# Patient Record
Sex: FEMALE | Race: WHITE | Employment: OTHER | URBAN - METROPOLITAN AREA
[De-identification: names, ages, dates, MRNs, and addresses within clinical notes are randomized per-mention and may not be internally consistent; named-entity substitution may affect disease eponyms.]

---

## 2021-02-01 LAB — AMB EXT COVID-19 RESULT: DETECTED

## 2021-02-25 ENCOUNTER — HOSPITAL ENCOUNTER (OUTPATIENT)
Facility: HOSPITAL | Age: 76
Setting detail: OBSERVATION
LOS: 1 days | Discharge: HOME | End: 2021-02-26
Attending: STUDENT IN AN ORGANIZED HEALTH CARE EDUCATION/TRAINING PROGRAM | Admitting: HOSPITALIST
Payer: MEDICARE

## 2021-02-25 ENCOUNTER — APPOINTMENT (OUTPATIENT)
Dept: GENERAL RADIOLOGY | Facility: HOSPITAL | Age: 76
End: 2021-02-25
Attending: STUDENT IN AN ORGANIZED HEALTH CARE EDUCATION/TRAINING PROGRAM
Payer: MEDICARE

## 2021-02-25 ENCOUNTER — WALK IN (OUTPATIENT)
Dept: URGENT CARE | Age: 76
End: 2021-02-25
Attending: EMERGENCY MEDICINE

## 2021-02-25 VITALS
DIASTOLIC BLOOD PRESSURE: 84 MMHG | TEMPERATURE: 98 F | WEIGHT: 170 LBS | HEART RATE: 81 BPM | RESPIRATION RATE: 16 BRPM | SYSTOLIC BLOOD PRESSURE: 151 MMHG | OXYGEN SATURATION: 98 %

## 2021-02-25 DIAGNOSIS — Z86.16 HISTORY OF COVID-19: ICD-10-CM

## 2021-02-25 DIAGNOSIS — R55 SYNCOPE, NEAR: ICD-10-CM

## 2021-02-25 DIAGNOSIS — R06.00 DYSPNEA ON EXERTION: ICD-10-CM

## 2021-02-25 DIAGNOSIS — R53.1 WEAKNESS GENERALIZED: Primary | ICD-10-CM

## 2021-02-25 DIAGNOSIS — E87.6 HYPOKALEMIA: ICD-10-CM

## 2021-02-25 DIAGNOSIS — R00.2 PALPITATIONS: ICD-10-CM

## 2021-02-25 DIAGNOSIS — E87.1 HYPONATREMIA: ICD-10-CM

## 2021-02-25 DIAGNOSIS — R00.2 PALPITATIONS: Primary | ICD-10-CM

## 2021-02-25 DIAGNOSIS — R51.9 NONINTRACTABLE EPISODIC HEADACHE, UNSPECIFIED HEADACHE TYPE: ICD-10-CM

## 2021-02-25 DIAGNOSIS — R53.1 WEAKNESS GENERALIZED: ICD-10-CM

## 2021-02-25 DIAGNOSIS — R06.00 DYSPNEA, UNSPECIFIED TYPE: ICD-10-CM

## 2021-02-25 PROBLEM — R06.09 DYSPNEA ON EXERTION: Status: ACTIVE | Noted: 2021-02-25

## 2021-02-25 LAB
ALBUMIN SERPL-MCNC: 3.9 G/DL (ref 3.4–5)
ALBUMIN/GLOB SERPL: 1.1 {RATIO} (ref 1–2)
ALP LIVER SERPL-CCNC: 58 U/L
ALT SERPL-CCNC: 43 U/L
ANION GAP SERPL CALC-SCNC: 9 MMOL/L (ref 0–18)
APTT PPP: 28.7 SECONDS (ref 25.4–36.1)
AST SERPL-CCNC: 26 U/L (ref 15–37)
ATRIAL RATE (BPM): 74
ATRIAL RATE: 80 BPM
BASOPHILS # BLD AUTO: 0.04 X10(3) UL (ref 0–0.2)
BASOPHILS NFR BLD AUTO: 0.4 %
BILIRUB SERPL-MCNC: 0.5 MG/DL (ref 0.1–2)
BILIRUB UR QL STRIP.AUTO: NEGATIVE
BUN BLD-MCNC: 10 MG/DL (ref 7–18)
BUN/CREAT SERPL: 14.3 (ref 10–20)
CALCIUM BLD-MCNC: 9.3 MG/DL (ref 8.5–10.1)
CHLORIDE SERPL-SCNC: 95 MMOL/L (ref 98–112)
CLARITY UR REFRACT.AUTO: CLEAR
CO2 SERPL-SCNC: 24 MMOL/L (ref 21–32)
CREAT BLD-MCNC: 0.7 MG/DL
D-DIMER: 0.3 UG/ML FEU (ref ?–0.75)
DEPRECATED RDW RBC AUTO: 37.8 FL (ref 35.1–46.3)
EOSINOPHIL # BLD AUTO: 0.06 X10(3) UL (ref 0–0.7)
EOSINOPHIL NFR BLD AUTO: 0.6 %
ERYTHROCYTE [DISTWIDTH] IN BLOOD BY AUTOMATED COUNT: 12 % (ref 11–15)
GLOBULIN PLAS-MCNC: 3.5 G/DL (ref 2.8–4.4)
GLUCOSE BLD-MCNC: 95 MG/DL (ref 70–99)
GLUCOSE UR STRIP.AUTO-MCNC: NEGATIVE MG/DL
HAV IGM SER QL: 1.9 MG/DL (ref 1.6–2.6)
HCT VFR BLD AUTO: 33.9 %
HGB BLD-MCNC: 12.3 G/DL
IMM GRANULOCYTES # BLD AUTO: 0.03 X10(3) UL (ref 0–1)
IMM GRANULOCYTES NFR BLD: 0.3 %
INR BLD: 1.01 (ref 0.89–1.11)
KETONES UR STRIP.AUTO-MCNC: NEGATIVE MG/DL
LEUKOCYTE ESTERASE UR QL STRIP.AUTO: NEGATIVE
LYMPHOCYTES # BLD AUTO: 2.63 X10(3) UL (ref 1–4)
LYMPHOCYTES NFR BLD AUTO: 27.8 %
M PROTEIN MFR SERPL ELPH: 7.4 G/DL (ref 6.4–8.2)
MCH RBC QN AUTO: 31.2 PG (ref 26–34)
MCHC RBC AUTO-ENTMCNC: 36.3 G/DL (ref 31–37)
MCV RBC AUTO: 86 FL
MONOCYTES # BLD AUTO: 0.85 X10(3) UL (ref 0.1–1)
MONOCYTES NFR BLD AUTO: 9 %
NEUTROPHILS # BLD AUTO: 5.85 X10 (3) UL (ref 1.5–7.7)
NEUTROPHILS # BLD AUTO: 5.85 X10(3) UL (ref 1.5–7.7)
NEUTROPHILS NFR BLD AUTO: 61.9 %
NITRITE UR QL STRIP.AUTO: NEGATIVE
NT-PROBNP SERPL-MCNC: 97 PG/ML (ref ?–450)
OSMOLALITY SERPL CALC.SUM OF ELEC: 265 MOSM/KG (ref 275–295)
P AXIS (DEGREES): 45
P AXIS: 52 DEGREES
P-R INTERVAL: 184 MS
PH UR STRIP.AUTO: 7 [PH] (ref 5–8)
PLATELET # BLD AUTO: 408 10(3)UL (ref 150–450)
POTASSIUM SERPL-SCNC: 3.3 MMOL/L (ref 3.5–5.1)
PR-INTERVAL (MSEC): 196
PROT UR STRIP.AUTO-MCNC: NEGATIVE MG/DL
PSA SERPL DL<=0.01 NG/ML-MCNC: 13.6 SECONDS (ref 12.4–14.6)
Q-T INTERVAL: 372 MS
QRS DURATION: 82 MS
QRS-INTERVAL (MSEC): 84
QT-INTERVAL (MSEC): 368
QTC CALCULATION (BEZET): 429 MS
QTC: 408
R AXIS (DEGREES): -10
R AXIS: -11 DEGREES
RBC # BLD AUTO: 3.94 X10(6)UL
RBC UR QL AUTO: NEGATIVE
REPORT TEXT: NORMAL
SODIUM SERPL-SCNC: 128 MMOL/L (ref 136–145)
SP GR UR STRIP.AUTO: 1 (ref 1–1.03)
T AXIS (DEGREES): 43
T AXIS: 31 DEGREES
T4 FREE SERPL-MCNC: 1.3 NG/DL (ref 0.8–1.7)
TROPONIN I SERPL-MCNC: <0.045 NG/ML (ref ?–0.04)
TSI SER-ACNC: 0.99 MIU/ML (ref 0.36–3.74)
TSI SER-ACNC: 1.01 MIU/ML (ref 0.36–3.74)
UROBILINOGEN UR STRIP.AUTO-MCNC: <2 MG/DL
VENTRICULAR RATE EKG/MIN (BPM): 74
VENTRICULAR RATE: 80 BPM
WBC # BLD AUTO: 9.5 X10(3) UL (ref 4–11)

## 2021-02-25 PROCEDURE — 99220 INITIAL OBSERVATION CARE,LEVL III: CPT | Performed by: HOSPITALIST

## 2021-02-25 PROCEDURE — 93005 ELECTROCARDIOGRAM TRACING: CPT | Performed by: EMERGENCY MEDICINE

## 2021-02-25 PROCEDURE — 71045 X-RAY EXAM CHEST 1 VIEW: CPT | Performed by: STUDENT IN AN ORGANIZED HEALTH CARE EDUCATION/TRAINING PROGRAM

## 2021-02-25 PROCEDURE — 99204 OFFICE O/P NEW MOD 45 MIN: CPT

## 2021-02-25 RX ORDER — FENOFIBRATE 67 MG/1
67 CAPSULE ORAL
Status: DISCONTINUED | OUTPATIENT
Start: 2021-02-26 | End: 2021-02-26

## 2021-02-25 RX ORDER — ENALAPRIL MALEATE 5 MG/1
5 TABLET ORAL 2 TIMES DAILY
Status: DISCONTINUED | OUTPATIENT
Start: 2021-02-26 | End: 2021-02-26

## 2021-02-25 RX ORDER — ENALAPRIL MALEATE 5 MG/1
5 TABLET ORAL 2 TIMES DAILY
COMMUNITY

## 2021-02-25 RX ORDER — HYDROCHLOROTHIAZIDE 25 MG/1
25 TABLET ORAL DAILY
COMMUNITY
End: 2022-01-24

## 2021-02-25 RX ORDER — ALPRAZOLAM 0.25 MG/1
0.25 TABLET ORAL NIGHTLY PRN
Status: DISCONTINUED | OUTPATIENT
Start: 2021-02-25 | End: 2021-02-26

## 2021-02-25 RX ORDER — ONDANSETRON 2 MG/ML
4 INJECTION INTRAMUSCULAR; INTRAVENOUS EVERY 6 HOURS PRN
Status: DISCONTINUED | OUTPATIENT
Start: 2021-02-25 | End: 2021-02-26

## 2021-02-25 RX ORDER — POTASSIUM CHLORIDE 20 MEQ/1
40 TABLET, EXTENDED RELEASE ORAL ONCE
Status: COMPLETED | OUTPATIENT
Start: 2021-02-25 | End: 2021-02-25

## 2021-02-25 RX ORDER — ENALAPRIL MALEATE 5 MG/1
TABLET ORAL
COMMUNITY
Start: 2020-12-29

## 2021-02-25 RX ORDER — METOPROLOL SUCCINATE 25 MG/1
25 TABLET, EXTENDED RELEASE ORAL DAILY
COMMUNITY
End: 2021-02-25 | Stop reason: CLARIF

## 2021-02-25 RX ORDER — FENOFIBRATE 48 MG/1
48 TABLET, COATED ORAL DAILY
COMMUNITY
End: 2022-01-24

## 2021-02-25 RX ORDER — ATORVASTATIN CALCIUM 40 MG/1
TABLET, FILM COATED ORAL
COMMUNITY
Start: 2020-12-29

## 2021-02-25 RX ORDER — ATORVASTATIN CALCIUM 40 MG/1
40 TABLET, FILM COATED ORAL NIGHTLY
Status: DISCONTINUED | OUTPATIENT
Start: 2021-02-25 | End: 2021-02-26

## 2021-02-25 RX ORDER — POTASSIUM CHLORIDE 20 MEQ/1
40 TABLET, EXTENDED RELEASE ORAL EVERY 4 HOURS
Status: DISCONTINUED | OUTPATIENT
Start: 2021-02-25 | End: 2021-02-25

## 2021-02-25 RX ORDER — SODIUM CHLORIDE 9 MG/ML
INJECTION, SOLUTION INTRAVENOUS CONTINUOUS
Status: DISCONTINUED | OUTPATIENT
Start: 2021-02-25 | End: 2021-02-26

## 2021-02-25 RX ORDER — ATORVASTATIN CALCIUM 40 MG/1
40 TABLET, FILM COATED ORAL NIGHTLY
COMMUNITY
End: 2022-01-24

## 2021-02-25 RX ORDER — POTASSIUM CHLORIDE 1.5 G/1.77G
20 POWDER, FOR SOLUTION ORAL DAILY
COMMUNITY

## 2021-02-25 RX ORDER — ACETAMINOPHEN 325 MG/1
650 TABLET ORAL EVERY 6 HOURS PRN
Status: DISCONTINUED | OUTPATIENT
Start: 2021-02-25 | End: 2021-02-26

## 2021-02-25 RX ORDER — HYDROCHLOROTHIAZIDE 25 MG/1
TABLET ORAL
COMMUNITY
Start: 2020-12-29

## 2021-02-25 RX ORDER — ALBUTEROL SULFATE 90 UG/1
2 AEROSOL, METERED RESPIRATORY (INHALATION) 4 TIMES DAILY
COMMUNITY
Start: 2021-02-02

## 2021-02-25 RX ORDER — ALBUTEROL SULFATE 90 UG/1
2 AEROSOL, METERED RESPIRATORY (INHALATION) 4 TIMES DAILY
Status: ON HOLD | COMMUNITY
End: 2021-02-26

## 2021-02-25 RX ORDER — ASPIRIN 81 MG/1
81 TABLET ORAL DAILY
COMMUNITY

## 2021-02-25 RX ORDER — FENOFIBRATE 54 MG/1
TABLET ORAL
COMMUNITY
Start: 2020-12-29

## 2021-02-25 RX ORDER — ASPIRIN 81 MG/1
81 TABLET ORAL
Status: DISCONTINUED | OUTPATIENT
Start: 2021-02-26 | End: 2021-02-26

## 2021-02-25 RX ORDER — POTASSIUM CHLORIDE 20 MEQ/1
TABLET, EXTENDED RELEASE ORAL
COMMUNITY
Start: 2020-12-29

## 2021-02-25 NOTE — ED INITIAL ASSESSMENT (HPI)
Pt here for palpitations for the past week. Pt daughter notes that she is not sleeping well at night and that she had a work up earlier this month without cardiac f/u from reymundo.

## 2021-02-25 NOTE — H&P
[facility]PITAL[facility]  History and Physical     Graciela Vargas Patient Status:  Emergency    2/15/1945 MRN [id number]   Location [address] [facility] Attending Abhay Shane MD   Hosp Day # 0 PCP Aashish Rodriguez     Chief Complaint: Caridad Jones Tab, Take 48 mg by mouth daily. , Disp: , Rfl:     •  Albuterol Sulfate  (90 Base) MCG/ACT Inhalation Aero Soln, Inhale 2 puffs into the lungs 4 (four) times daily. , Disp: , Rfl:     •  metoprolol Tartrate 25 MG Oral Tab, Take 25 mg by mouth 2 (two) 168 hours. Inflammatory Markers  Recent Labs   Lab 02/25/21  1258   DDIMER 0.30         ASSESSMENT / PLAN:     1. Dyspnea, on room air - CXR, dimer, troponin, BNP - all negative  2. Palpitations, NSR  3. Hyponatremia  4. Hypokalemia  5.  Essential hypert

## 2021-02-25 NOTE — ED PROVIDER NOTES
Patient Seen in: [facility] Emergency Department      History   Patient presents with:  Arrythmia/Palpitations    Stated Complaint: palpitations    HPI/Subjective:   HPI    Patient is a 20-year-old female presented emergency department with palpitat Normocephalic and atraumatic. Eyes: No scleral icterus. Neck: Normal range of motion. Neck supple. Cardiovascular: Normal rate, regular rhythm, normal heart sounds and intact distal pulses. Exam reveals no gallop and no friction rub.     No murmur hea Report. Rate: 80  Rhythm: Sinus Rhythm  Reading: Normal sinus rhythm, normal intervals, normal axis, no ST elevations, no previous EKG available comparison.                  MDM      Extensive differential diagnosis was considered for the patient including

## 2021-02-26 ENCOUNTER — APPOINTMENT (OUTPATIENT)
Dept: CV DIAGNOSTICS | Facility: HOSPITAL | Age: 76
End: 2021-02-26
Attending: HOSPITALIST
Payer: MEDICARE

## 2021-02-26 VITALS
TEMPERATURE: 98 F | WEIGHT: 172.38 LBS | DIASTOLIC BLOOD PRESSURE: 72 MMHG | OXYGEN SATURATION: 96 % | SYSTOLIC BLOOD PRESSURE: 136 MMHG | HEART RATE: 87 BPM | RESPIRATION RATE: 16 BRPM

## 2021-02-26 LAB
ANION GAP SERPL CALC-SCNC: 5 MMOL/L (ref 0–18)
BUN BLD-MCNC: 16 MG/DL (ref 7–18)
BUN/CREAT SERPL: 18 (ref 10–20)
CALCIUM BLD-MCNC: 9.3 MG/DL (ref 8.5–10.1)
CHLORIDE SERPL-SCNC: 105 MMOL/L (ref 98–112)
CO2 SERPL-SCNC: 27 MMOL/L (ref 21–32)
CREAT BLD-MCNC: 0.89 MG/DL
GLUCOSE BLD-MCNC: 85 MG/DL (ref 70–99)
OSMOLALITY SERPL CALC.SUM OF ELEC: 284 MOSM/KG (ref 275–295)
POTASSIUM SERPL-SCNC: 4.1 MMOL/L (ref 3.5–5.1)
POTASSIUM SERPL-SCNC: 4.6 MMOL/L (ref 3.5–5.1)
SODIUM SERPL-SCNC: 137 MMOL/L (ref 136–145)

## 2021-02-26 PROCEDURE — 93306 TTE W/DOPPLER COMPLETE: CPT | Performed by: HOSPITALIST

## 2021-02-26 PROCEDURE — 99217 OBSERVATION CARE DISCHARGE: CPT | Performed by: HOSPITALIST

## 2021-02-26 NOTE — PLAN OF CARE
Problem: Patient/Family Goals  Goal: Patient/Family Long Term Goal  Description: Patient's Long Term Goal: to go home    Interventions:  - echocardiogram  - See additional Care Plan goals for specific interventions  Outcome: Adequate for Discharge  Goal:

## 2021-02-26 NOTE — CM/SW NOTE
Patient failed inpatient criteria. Second level of review completed and supports observation. UR committee in agreement. Discussed with Dr. laurent  who approves observation status. MOON given to the patient and order written.

## 2021-02-26 NOTE — PROGRESS NOTES
02/25/21 1717 02/25/21 1719 02/25/21 1720   Vital Signs   /64 147/75 145/75   MAP (mmHg) 83 92 91   BP Location Left arm Left arm Left arm   BP Method Automatic Automatic Automatic   Patient Position Lying Sitting Standing

## 2021-02-26 NOTE — PLAN OF CARE
Pt. Is alert and oriented times four. Lungs clear on auscultation. Pt. Has no c/o pain and no shortness of breath at present. Echocardiogram to be resulted.

## 2021-02-26 NOTE — PAYOR COMM NOTE
--------------  ADMISSION REVIEW     Shania Hodges MA [facility]  Subscriber #:  [id number]  Authorization Number: [id number]      [facility]  OBS STATUS    Still in house

## 2021-02-26 NOTE — DISCHARGE SUMMARY
[facility]ITAL[facility]  DISCHARGE SUMMARY     Graciela Vargas Patient Status:  Observation    2/15/1945 MRN [id number]   [facility] 8NE-A Attending Josefina Suarez MD   Hosp Day # 1 PCP Eleazar Wallace     Date of Admission: 2021  Date of Dis 81 MG Tbec      Take 81 mg by mouth daily. Refills: 0     atorvastatin 40 MG Tabs  Commonly known as: LIPITOR      Take 40 mg by mouth nightly.    Refills: 0     Enalapril Maleate 5 MG Tabs  Commonly known as: VASOTEC      Take 5 mg by mouth 2 (two) times

## 2021-02-26 NOTE — PROGRESS NOTES
[facility]  Progress Note     Graciela Vargas Patient Status:  Inpatient    2/15/1945 MRN [id number]   [facility] 8NE-A Attending Pia Blount MD   Hosp Day # 1 PCP Creed Cons     Chief Complaint: Palpitations     S: Patient de ASSESSMENT / PLAN:     1. Dyspnea, on room air - CXR, dimer, troponin, BNP - all negative  2. Palpitations, NSR, TSH ok  3. Hyponatremia  4. Hypokalemia  5. Essential hypertension   6. Dyslipidemia  7.  Insomnia, chronic      Plan:  Stop IVF  Hold hyd

## 2021-02-26 NOTE — PLAN OF CARE
Admitted to 3035. Pt is A/O x4. Primarily [place] speaking. Daughter at the bedside to help with admission navigator. Orthostatics completed, see note. Plan for echo in the AM. Pt and pt daughter updated on POC. Call light within reach. Needs attended to.

## 2021-02-26 NOTE — PLAN OF CARE
Received pt at 1930. Alert and oriented x4. Sw[place] is primary language but able to communicate in [place]. No pain or cardiac symptoms overnight.  0.9 NS infusing at 75mL/hr.   Prn xanax at night for sleep, as patient's daughter reports that pt has not

## 2021-03-01 NOTE — PAYOR COMM NOTE
--------------  DISCHARGE REVIEW    Prakash Young MA [facility]  Subscriber #:  [id number]  Authorization Number: [id number]    Admit date: 2/25/21  Admit time:  1719  Discharge Date: 2/26/2021  5:02 PM     Admitting Physician: Giles Gamez MD  Attending Conori the ER with shortness of breath and palpitations. Patient evaluated in the ER and noted to have hyponatremia. Patient started on IVF and admitted. Work-up for dyspnea unremarkable. Patient remains on room air. No palpitations off Albuterol. Feeling well.  E

## 2021-03-07 ENCOUNTER — HOSPITAL ENCOUNTER (EMERGENCY)
Facility: HOSPITAL | Age: 76
Discharge: HOME | End: 2021-03-07
Attending: EMERGENCY MEDICINE
Payer: MEDICARE

## 2021-03-07 ENCOUNTER — APPOINTMENT (OUTPATIENT)
Dept: CT IMAGING | Facility: HOSPITAL | Age: 76
End: 2021-03-07
Attending: EMERGENCY MEDICINE
Payer: MEDICARE

## 2021-03-07 VITALS
DIASTOLIC BLOOD PRESSURE: 84 MMHG | RESPIRATION RATE: 20 BRPM | BODY MASS INDEX: 33.77 KG/M2 | OXYGEN SATURATION: 97 % | HEIGHT: 60 IN | WEIGHT: 172 LBS | HEART RATE: 76 BPM | SYSTOLIC BLOOD PRESSURE: 167 MMHG | TEMPERATURE: 99 F

## 2021-03-07 DIAGNOSIS — K21.9 CHEST PAIN DUE TO GERD: ICD-10-CM

## 2021-03-07 DIAGNOSIS — R07.9 CHEST PAIN OF UNCERTAIN ETIOLOGY: Primary | ICD-10-CM

## 2021-03-07 DIAGNOSIS — R07.9 CHEST PAIN DUE TO GERD: ICD-10-CM

## 2021-03-07 LAB
ANION GAP SERPL CALC-SCNC: 8 MMOL/L (ref 0–18)
ATRIAL RATE: 77 BPM
BASOPHILS # BLD AUTO: 0.07 X10(3) UL (ref 0–0.2)
BASOPHILS NFR BLD AUTO: 0.7 %
BUN BLD-MCNC: 15 MG/DL (ref 7–18)
BUN/CREAT SERPL: 17.2 (ref 10–20)
CALCIUM BLD-MCNC: 9 MG/DL (ref 8.5–10.1)
CHLORIDE SERPL-SCNC: 100 MMOL/L (ref 98–112)
CO2 SERPL-SCNC: 25 MMOL/L (ref 21–32)
CREAT BLD-MCNC: 0.87 MG/DL
DEPRECATED RDW RBC AUTO: 40.5 FL (ref 35.1–46.3)
EOSINOPHIL # BLD AUTO: 0.06 X10(3) UL (ref 0–0.7)
EOSINOPHIL NFR BLD AUTO: 0.6 %
ERYTHROCYTE [DISTWIDTH] IN BLOOD BY AUTOMATED COUNT: 12.4 % (ref 11–15)
GLUCOSE BLD-MCNC: 99 MG/DL (ref 70–99)
HCT VFR BLD AUTO: 33.4 %
HGB BLD-MCNC: 11.6 G/DL
IMM GRANULOCYTES # BLD AUTO: 0.1 X10(3) UL (ref 0–1)
IMM GRANULOCYTES NFR BLD: 0.9 %
LYMPHOCYTES # BLD AUTO: 2.4 X10(3) UL (ref 1–4)
LYMPHOCYTES NFR BLD AUTO: 22.5 %
MCH RBC QN AUTO: 31.1 PG (ref 26–34)
MCHC RBC AUTO-ENTMCNC: 34.7 G/DL (ref 31–37)
MCV RBC AUTO: 89.5 FL
MONOCYTES # BLD AUTO: 0.94 X10(3) UL (ref 0.1–1)
MONOCYTES NFR BLD AUTO: 8.8 %
NEUTROPHILS # BLD AUTO: 7.1 X10 (3) UL (ref 1.5–7.7)
NEUTROPHILS # BLD AUTO: 7.1 X10(3) UL (ref 1.5–7.7)
NEUTROPHILS NFR BLD AUTO: 66.5 %
OSMOLALITY SERPL CALC.SUM OF ELEC: 277 MOSM/KG (ref 275–295)
P-R INTERVAL: 160 MS
PLATELET # BLD AUTO: 360 10(3)UL (ref 150–450)
POTASSIUM SERPL-SCNC: 3.5 MMOL/L (ref 3.5–5.1)
Q-T INTERVAL: 366 MS
QRS DURATION: 86 MS
QTC CALCULATION (BEZET): 414 MS
R AXIS: -3 DEGREES
RBC # BLD AUTO: 3.73 X10(6)UL
SODIUM SERPL-SCNC: 133 MMOL/L (ref 136–145)
T AXIS: 42 DEGREES
TROPONIN I SERPL-MCNC: <0.045 NG/ML (ref ?–0.04)
VENTRICULAR RATE: 77 BPM
WBC # BLD AUTO: 10.7 X10(3) UL (ref 4–11)

## 2021-03-07 PROCEDURE — 99285 EMERGENCY DEPT VISIT HI MDM: CPT

## 2021-03-07 PROCEDURE — 85025 COMPLETE CBC W/AUTO DIFF WBC: CPT | Performed by: EMERGENCY MEDICINE

## 2021-03-07 PROCEDURE — 99284 EMERGENCY DEPT VISIT MOD MDM: CPT

## 2021-03-07 PROCEDURE — 93005 ELECTROCARDIOGRAM TRACING: CPT

## 2021-03-07 PROCEDURE — 84484 ASSAY OF TROPONIN QUANT: CPT | Performed by: EMERGENCY MEDICINE

## 2021-03-07 PROCEDURE — 93010 ELECTROCARDIOGRAM REPORT: CPT

## 2021-03-07 PROCEDURE — 71275 CT ANGIOGRAPHY CHEST: CPT | Performed by: EMERGENCY MEDICINE

## 2021-03-07 PROCEDURE — 80048 BASIC METABOLIC PNL TOTAL CA: CPT | Performed by: EMERGENCY MEDICINE

## 2021-03-07 PROCEDURE — 36415 COLL VENOUS BLD VENIPUNCTURE: CPT

## 2021-03-07 RX ORDER — NITROGLYCERIN 0.4 MG/1
0.4 TABLET SUBLINGUAL EVERY 5 MIN PRN
COMMUNITY

## 2021-03-07 RX ORDER — CITALOPRAM 20 MG/1
20 TABLET ORAL DAILY
COMMUNITY

## 2021-03-07 RX ORDER — PANTOPRAZOLE SODIUM 40 MG/1
40 TABLET, DELAYED RELEASE ORAL DAILY
Qty: 30 TABLET | Refills: 0 | Status: SHIPPED | OUTPATIENT
Start: 2021-03-07 | End: 2021-04-06

## 2021-03-07 RX ORDER — ALPRAZOLAM 0.5 MG/1
0.5 TABLET ORAL NIGHTLY PRN
COMMUNITY

## 2021-03-07 NOTE — ED PROVIDER NOTES
Patient Seen in: [facility] Emergency Department      History   Patient presents with:  Chest Pain Angina    Stated Complaint: Chest Pain    HPI/Subjective:   HPI    80-year-old woman with a history of hypertension    A 2/25-26/21    77year old fem No abdominal masses.   No peritoneal signs   Extremities: no edema, normal peripheral pulses   Neuro: Alert oriented and nonfocal         ED Course     Labs Reviewed   BASIC METABOLIC PANEL (8) - Abnormal; Notable for the following components:       Result of Omnipaque 350  FINDINGS:  The right lung base is not completely included in the field of view. LUNGS:  Calcified granuloma in the left lower lobe. No lobar consolidation. Mild diffuse bronchiectasis noted.   Mild scattered atelectasis/scarring in the b was given written discharge instructions. Patient agrees to return for any concerns and voiced understanding and all questions were answered at this time.     Disposition and Plan     Clinical Impression:  Chest pain of uncertain etiology  (primary encount

## 2021-03-07 NOTE — ED INITIAL ASSESSMENT (HPI)
Pt c/o chest tightness that radiates to her back, as well as, palpitations that started yesterday; Pt hx HTN

## 2022-01-24 ENCOUNTER — OFFICE VISIT (OUTPATIENT)
Dept: INTERNAL MEDICINE CLINIC | Facility: CLINIC | Age: 77
End: 2022-01-24
Payer: MEDICARE

## 2022-01-24 VITALS
HEART RATE: 80 BPM | BODY MASS INDEX: 35.26 KG/M2 | HEIGHT: 60.5 IN | OXYGEN SATURATION: 96 % | DIASTOLIC BLOOD PRESSURE: 78 MMHG | SYSTOLIC BLOOD PRESSURE: 128 MMHG | WEIGHT: 184.38 LBS | TEMPERATURE: 98 F | RESPIRATION RATE: 18 BRPM

## 2022-01-24 DIAGNOSIS — I10 ESSENTIAL HYPERTENSION: Primary | ICD-10-CM

## 2022-01-24 DIAGNOSIS — E78.00 HYPERCHOLESTEROLEMIA: ICD-10-CM

## 2022-01-24 DIAGNOSIS — F41.9 ANXIETY: ICD-10-CM

## 2022-01-24 DIAGNOSIS — R00.2 PALPITATIONS: ICD-10-CM

## 2022-01-24 PROBLEM — E87.1 HYPONATREMIA: Status: RESOLVED | Noted: 2021-02-25 | Resolved: 2022-01-24

## 2022-01-24 PROBLEM — E87.6 HYPOKALEMIA: Status: RESOLVED | Noted: 2021-02-25 | Resolved: 2022-01-24

## 2022-01-24 PROBLEM — R06.09 DYSPNEA ON EXERTION: Status: RESOLVED | Noted: 2021-02-25 | Resolved: 2022-01-24

## 2022-01-24 PROBLEM — R55 SYNCOPE, NEAR: Status: RESOLVED | Noted: 2021-02-25 | Resolved: 2022-01-24

## 2022-01-24 PROBLEM — R06.00 DYSPNEA ON EXERTION: Status: RESOLVED | Noted: 2021-02-25 | Resolved: 2022-01-24

## 2022-01-24 PROCEDURE — 3008F BODY MASS INDEX DOCD: CPT | Performed by: FAMILY MEDICINE

## 2022-01-24 PROCEDURE — 3078F DIAST BP <80 MM HG: CPT | Performed by: FAMILY MEDICINE

## 2022-01-24 PROCEDURE — 99203 OFFICE O/P NEW LOW 30 MIN: CPT | Performed by: FAMILY MEDICINE

## 2022-01-24 PROCEDURE — 3074F SYST BP LT 130 MM HG: CPT | Performed by: FAMILY MEDICINE

## 2022-01-24 RX ORDER — ALPRAZOLAM 0.25 MG/1
TABLET ORAL
COMMUNITY
Start: 2021-12-06

## 2022-01-24 RX ORDER — FENOFIBRATE 54 MG/1
TABLET ORAL
COMMUNITY
Start: 2022-01-04

## 2022-01-24 RX ORDER — HYDROCHLOROTHIAZIDE 25 MG/1
12.5 TABLET ORAL DAILY
Refills: 0 | COMMUNITY
Start: 2022-01-24

## 2022-01-24 RX ORDER — POTASSIUM CHLORIDE 20 MEQ/1
TABLET, EXTENDED RELEASE ORAL
COMMUNITY
Start: 2022-01-04 | End: 2022-01-24

## 2022-01-24 RX ORDER — AMLODIPINE BESYLATE 5 MG/1
TABLET ORAL
COMMUNITY
Start: 2021-12-28

## 2022-01-24 RX ORDER — HYDROXYZINE HYDROCHLORIDE 25 MG/1
TABLET, FILM COATED ORAL
COMMUNITY
Start: 2021-12-29

## 2022-01-24 NOTE — PROGRESS NOTES
Jessica Crane is a 77year old female.   HPI:   New pt to me   Here to get established   Has HTN and HPL for 20 yrs   Was only on the lipitor and fibrate med as well as the enalapril  Was in ER last yr a few times d/t elevated BP   Does ck her #s at home otherwise  SKIN: denies rashes  RESPIRATORY: denies shortness of breath   CARDIOVASCULAR: denies chest pain on exertion  GI: denies abdominal pain  NEURO: denies headaches at present       EXAM:   /78 (BP Location: Right arm, Patient Position: Sittin

## 2022-01-28 ENCOUNTER — TELEPHONE (OUTPATIENT)
Dept: INTERNAL MEDICINE CLINIC | Facility: CLINIC | Age: 77
End: 2022-01-28

## 2022-01-28 PROBLEM — Z86.012 HISTORY OF BENIGN CARCINOID TUMOR: Status: ACTIVE | Noted: 2022-01-28

## 2022-01-28 PROBLEM — N18.31 STAGE 3A CHRONIC KIDNEY DISEASE (HCC): Status: ACTIVE | Noted: 2022-01-28

## 2022-01-28 PROBLEM — E55.9 VITAMIN D DEFICIENCY: Status: ACTIVE | Noted: 2022-01-28

## 2022-01-28 LAB
ALBUMIN: 4.4 G/DL
ALKALINE PHOSPHATASE: 62
ALT: 32
ANION GAP: 9 MMOL/L (ref ?–16)
AST: 34
BILIRUBIN, TOTAL: 0.8 MG/DL
BUN: 19 MG/DL (ref 7–22)
CALCIUM: 9.2
CARBON DIOXIDE: 25
CHLORIDE: 102
CHOL/HDL RATIO: 2.9
CREATININE: 0.91 MG/DL
GFR (CKD-EPI)CORRECTED: 61.6
GFR (CKD-EPI)CORRECTED: 61.6
GLUCOSE: 100
HDL CHOLESTEROL: 53 MG/DL
HEMOGLOBIN A1C: 5.6
LDL CHOLESTEROL: 80 MG/DL (ref ?–130)
MICROALBUMIN, URINE: <0.7
MICROALBUMIN, URINE: <0.7
NON HDL CHOL: 101
POTASSIUM: 3.6
SODIUM: 136
TOTAL CHOLESTEROL: 154 MG/DL (ref ?–200)
TOTAL PROTEIN: 7.7
TRIGLYCERIDES: 106 MG/DL
URINE CREATININE: 32.6
URINE CREATININE: 32.6
VLDL: 21

## 2022-01-28 NOTE — TELEPHONE ENCOUNTER
Patients labs dropped off     Most recent labs entered into Epic and all paperwork placed in  in basket for review

## 2022-02-17 ENCOUNTER — OFFICE VISIT (OUTPATIENT)
Dept: INTERNAL MEDICINE CLINIC | Facility: CLINIC | Age: 77
End: 2022-02-17
Payer: MEDICARE

## 2022-02-17 VITALS
SYSTOLIC BLOOD PRESSURE: 140 MMHG | DIASTOLIC BLOOD PRESSURE: 70 MMHG | OXYGEN SATURATION: 98 % | WEIGHT: 190.38 LBS | TEMPERATURE: 98 F | HEIGHT: 60.5 IN | RESPIRATION RATE: 18 BRPM | BODY MASS INDEX: 36.41 KG/M2 | HEART RATE: 78 BPM

## 2022-02-17 DIAGNOSIS — E78.00 HYPERCHOLESTEROLEMIA: ICD-10-CM

## 2022-02-17 DIAGNOSIS — R00.2 PALPITATIONS: ICD-10-CM

## 2022-02-17 DIAGNOSIS — N18.31 STAGE 3A CHRONIC KIDNEY DISEASE (HCC): ICD-10-CM

## 2022-02-17 DIAGNOSIS — I10 ESSENTIAL HYPERTENSION: Primary | ICD-10-CM

## 2022-02-17 PROCEDURE — 99214 OFFICE O/P EST MOD 30 MIN: CPT | Performed by: FAMILY MEDICINE

## 2022-02-17 PROCEDURE — 3078F DIAST BP <80 MM HG: CPT | Performed by: FAMILY MEDICINE

## 2022-02-17 PROCEDURE — 3008F BODY MASS INDEX DOCD: CPT | Performed by: FAMILY MEDICINE

## 2022-02-17 PROCEDURE — 3077F SYST BP >= 140 MM HG: CPT | Performed by: FAMILY MEDICINE

## 2022-02-17 RX ORDER — AMLODIPINE BESYLATE 5 MG/1
5 TABLET ORAL DAILY
Qty: 90 TABLET | Refills: 1 | Status: SHIPPED | OUTPATIENT
Start: 2022-02-17 | End: 2022-03-10

## 2022-02-17 RX ORDER — ATORVASTATIN CALCIUM 40 MG/1
40 TABLET, FILM COATED ORAL NIGHTLY
Refills: 0 | COMMUNITY
Start: 2022-02-17

## 2022-02-17 RX ORDER — ENALAPRIL MALEATE 5 MG/1
5 TABLET ORAL 2 TIMES DAILY
Qty: 180 TABLET | Refills: 1 | Status: SHIPPED | OUTPATIENT
Start: 2022-02-17 | End: 2022-03-10

## 2022-02-17 RX ORDER — HYDROXYZINE HYDROCHLORIDE 25 MG/1
25 TABLET, FILM COATED ORAL EVERY 8 HOURS PRN
Qty: 90 TABLET | Refills: 1 | Status: SHIPPED | OUTPATIENT
Start: 2022-02-17

## 2022-02-17 RX ORDER — HYDROCHLOROTHIAZIDE 12.5 MG/1
12.5 TABLET ORAL DAILY
Qty: 90 TABLET | Refills: 1 | Status: SHIPPED | OUTPATIENT
Start: 2022-02-17

## 2022-03-07 ENCOUNTER — TELEPHONE (OUTPATIENT)
Dept: INTERNAL MEDICINE CLINIC | Facility: CLINIC | Age: 77
End: 2022-03-07

## 2022-03-07 NOTE — TELEPHONE ENCOUNTER
Pt daughter/Ariela reporting pt is experiencing burning sensation in feet, radiating upwards bilateral legs. Starts in the am approx 1 hour after taking Amlodipine, resolves itself in a \"short period of time\". Pt believes this is a s/e from amlodipine. Has been experiencing sxs for several months, did mention during 2/17 OV with Dr. Jordon Mckinney.   Was not taking medications regularly at time of last OV \"she was experimenting with medications\" now is taking all medications regularly as directed    Ariela/Daughter [phone] (OK per verbal release)

## 2022-03-07 NOTE — TELEPHONE ENCOUNTER
Spoke with Vinod Adam, advised take 1/2 of 5mg amlodipine and see if that helps. Vinod Adam verbalized understanding and agreeable.

## 2022-03-10 ENCOUNTER — OFFICE VISIT (OUTPATIENT)
Dept: INTERNAL MEDICINE CLINIC | Facility: CLINIC | Age: 77
End: 2022-03-10
Payer: MEDICARE

## 2022-03-10 VITALS
WEIGHT: 189.38 LBS | SYSTOLIC BLOOD PRESSURE: 135 MMHG | HEIGHT: 60.5 IN | DIASTOLIC BLOOD PRESSURE: 80 MMHG | OXYGEN SATURATION: 96 % | RESPIRATION RATE: 18 BRPM | TEMPERATURE: 98 F | HEART RATE: 76 BPM | BODY MASS INDEX: 36.22 KG/M2

## 2022-03-10 DIAGNOSIS — I10 ESSENTIAL HYPERTENSION: ICD-10-CM

## 2022-03-10 DIAGNOSIS — G60.9 IDIOPATHIC PERIPHERAL NEUROPATHY: Primary | ICD-10-CM

## 2022-03-10 PROCEDURE — 3008F BODY MASS INDEX DOCD: CPT | Performed by: FAMILY MEDICINE

## 2022-03-10 PROCEDURE — 99214 OFFICE O/P EST MOD 30 MIN: CPT | Performed by: FAMILY MEDICINE

## 2022-03-10 PROCEDURE — 3079F DIAST BP 80-89 MM HG: CPT | Performed by: FAMILY MEDICINE

## 2022-03-10 PROCEDURE — 3075F SYST BP GE 130 - 139MM HG: CPT | Performed by: FAMILY MEDICINE

## 2022-03-10 RX ORDER — AMLODIPINE BESYLATE AND BENAZEPRIL HYDROCHLORIDE 5; 20 MG/1; MG/1
1 CAPSULE ORAL DAILY
Qty: 90 CAPSULE | Refills: 0 | Status: SHIPPED | OUTPATIENT
Start: 2022-03-10

## 2022-03-10 RX ORDER — CITALOPRAM 20 MG/1
TABLET ORAL
COMMUNITY
Start: 2022-02-23

## 2022-03-21 ENCOUNTER — TELEPHONE (OUTPATIENT)
Dept: INTERNAL MEDICINE CLINIC | Facility: CLINIC | Age: 77
End: 2022-03-21

## 2022-03-21 NOTE — TELEPHONE ENCOUNTER
Pts son called stating Young Lubin changed her bp medication,Amlodipine, but she is still experiencing headaches. He also states her bp has been higher than normal, most recent reading was 162/94. Pts son requesting a call back with further recommendations for the pt.

## 2022-03-21 NOTE — TELEPHONE ENCOUNTER
I would continue w meds as is.   I feels she may getting too stressed over her meds etc     fani 2 weeks  if s/s are severe, rec ER

## 2022-03-21 NOTE — TELEPHONE ENCOUNTER
Spoke with pt's son Marcia Cullen, advised for pt to continue with medications as is, recheck 2 weeks, if s/s are severe, recommended ER. Son transferred to  for scheduling.

## 2022-03-21 NOTE — TELEPHONE ENCOUNTER
Spoke with pt's son Rosa Maria Suazo stating his mom started taking amLODIPine Besy-Benazepril HCl 5-20 MG Oral Cap, since then has been experiencing HA, no other symptoms, no dizziness, no SOB, No CP, no blurred vision or any others. Per son, pt's BP has been high 165/90, 162/94, and HR 83. Her symptoms started 3 days ago. Please advise.

## 2022-04-01 ENCOUNTER — HOSPITAL ENCOUNTER (OUTPATIENT)
Dept: CV DIAGNOSTICS | Facility: HOSPITAL | Age: 77
Discharge: HOME | End: 2022-04-01
Attending: FAMILY MEDICINE
Payer: MEDICARE

## 2022-04-01 DIAGNOSIS — R00.2 PALPITATIONS: ICD-10-CM

## 2022-04-01 PROCEDURE — 93226 XTRNL ECG REC<48 HR SCAN A/R: CPT | Performed by: FAMILY MEDICINE

## 2022-04-01 PROCEDURE — 93227 XTRNL ECG REC<48 HR R&I: CPT | Performed by: FAMILY MEDICINE

## 2022-04-01 PROCEDURE — 93225 XTRNL ECG REC<48 HRS REC: CPT | Performed by: FAMILY MEDICINE

## 2022-04-06 ENCOUNTER — OFFICE VISIT (OUTPATIENT)
Dept: INTERNAL MEDICINE CLINIC | Facility: CLINIC | Age: 77
End: 2022-04-06
Payer: MEDICARE

## 2022-04-06 VITALS
RESPIRATION RATE: 16 BRPM | HEIGHT: 60.5 IN | WEIGHT: 189.81 LBS | HEART RATE: 86 BPM | BODY MASS INDEX: 36.3 KG/M2 | SYSTOLIC BLOOD PRESSURE: 125 MMHG | TEMPERATURE: 99 F | OXYGEN SATURATION: 97 % | DIASTOLIC BLOOD PRESSURE: 80 MMHG

## 2022-04-06 DIAGNOSIS — I10 ESSENTIAL HYPERTENSION: Primary | ICD-10-CM

## 2022-04-06 DIAGNOSIS — R00.2 PALPITATIONS: ICD-10-CM

## 2022-04-06 PROCEDURE — 3008F BODY MASS INDEX DOCD: CPT | Performed by: FAMILY MEDICINE

## 2022-04-06 PROCEDURE — 99213 OFFICE O/P EST LOW 20 MIN: CPT | Performed by: FAMILY MEDICINE

## 2022-04-06 PROCEDURE — 3079F DIAST BP 80-89 MM HG: CPT | Performed by: FAMILY MEDICINE

## 2022-04-06 PROCEDURE — 3074F SYST BP LT 130 MM HG: CPT | Performed by: FAMILY MEDICINE

## 2022-04-06 RX ORDER — POTASSIUM CHLORIDE 20 MEQ/1
TABLET, EXTENDED RELEASE ORAL
COMMUNITY
Start: 2022-03-21

## 2022-04-06 RX ORDER — ENALAPRIL MALEATE 10 MG/1
10 TABLET ORAL 2 TIMES DAILY
Qty: 60 TABLET | Refills: 0 | Status: SHIPPED | OUTPATIENT
Start: 2022-04-06

## 2022-04-11 ENCOUNTER — TELEPHONE (OUTPATIENT)
Dept: INTERNAL MEDICINE CLINIC | Facility: CLINIC | Age: 77
End: 2022-04-11

## 2022-04-11 NOTE — TELEPHONE ENCOUNTER
Pt son/Sheyla reporting patient BP has been 175/95 - 175/00 since 4/6/22 visit with TO. Headaches are resolved. Is medication adjustment appropriate ? Apple Garzon is listed on patient verbal release as emergeny contact only. ADRIANA Titus (Apple Ryann spouse) is only person listed for VANDANA. Apple Garzon did accompany patient to 4/6/22 visit. Sheyla informed he is not on verbal release, OK to call Fabby Weaver if necessary. Patient insurance still indicates Lakeisha Prakash as pcp.   Per Sheyla - Ins was changed to BELIAW/[facility] 4/8/22, reference #[id number]    L[facility] [address] [facility] [phone]

## 2022-04-11 NOTE — TELEPHONE ENCOUNTER
Called LUCERO Pettit - [phone]    Provided TO recommendation to schedule follow up appt in 2 weeks. LUCERO verbalized understanding. Will also update verbal forms at next appt.

## 2022-04-20 ENCOUNTER — OFFICE VISIT (OUTPATIENT)
Dept: INTERNAL MEDICINE CLINIC | Facility: CLINIC | Age: 77
End: 2022-04-20
Payer: MEDICARE

## 2022-04-20 VITALS
RESPIRATION RATE: 16 BRPM | DIASTOLIC BLOOD PRESSURE: 80 MMHG | OXYGEN SATURATION: 98 % | HEART RATE: 77 BPM | BODY MASS INDEX: 35.89 KG/M2 | SYSTOLIC BLOOD PRESSURE: 150 MMHG | WEIGHT: 187.63 LBS | HEIGHT: 60.5 IN

## 2022-04-20 DIAGNOSIS — I10 ESSENTIAL HYPERTENSION: Primary | ICD-10-CM

## 2022-04-20 PROCEDURE — 99213 OFFICE O/P EST LOW 20 MIN: CPT | Performed by: FAMILY MEDICINE

## 2022-04-20 PROCEDURE — 3077F SYST BP >= 140 MM HG: CPT | Performed by: FAMILY MEDICINE

## 2022-04-20 PROCEDURE — 3008F BODY MASS INDEX DOCD: CPT | Performed by: FAMILY MEDICINE

## 2022-04-20 PROCEDURE — 3079F DIAST BP 80-89 MM HG: CPT | Performed by: FAMILY MEDICINE

## 2022-04-20 RX ORDER — VALSARTAN 160 MG/1
160 TABLET ORAL DAILY
Qty: 30 TABLET | Refills: 0 | Status: SHIPPED | OUTPATIENT
Start: 2022-04-20

## 2022-05-04 ENCOUNTER — TELEPHONE (OUTPATIENT)
Dept: INTERNAL MEDICINE CLINIC | Facility: CLINIC | Age: 77
End: 2022-05-04

## 2022-05-04 NOTE — TELEPHONE ENCOUNTER
Received fax for clarification needed:    Clarification to see if pt was is currently using Enalapril 5 mg or Valsartan 160 mg. Filled out form and placed in TO basket to review/sign off on.

## 2022-05-05 ENCOUNTER — TELEPHONE (OUTPATIENT)
Dept: INTERNAL MEDICINE CLINIC | Facility: CLINIC | Age: 77
End: 2022-05-05

## 2022-05-05 DIAGNOSIS — I10 ESSENTIAL HYPERTENSION: Primary | ICD-10-CM

## 2022-05-05 DIAGNOSIS — R00.2 PALPITATIONS: ICD-10-CM

## 2022-05-05 NOTE — TELEPHONE ENCOUNTER
Pts son Anabel Ortiz, on HIPPA, called to give an update because pts BP medication was recently changed to Valsartan. He states pts BP is still high, it has been 170/95,99. He states her headaches's have gotten better but the BP is still high. He states she does not had any bad side effects with being on this rx. Pts son would like to know what is reccommended at this point now.

## 2022-05-05 NOTE — TELEPHONE ENCOUNTER
Recommendations? Next OV 5/20/22    Spoke with Sheyla patient's son who states b/p's remain elevated despite medication change. This AM b/p was 180/100. Average has been 170/90's. No new symptoms outside of chronic issues. C/O slight headache and numbness/tingling to legs and feet which is not new for patient. Denies: Pain, diet changes, swelling, dizziness.

## 2022-05-05 NOTE — TELEPHONE ENCOUNTER
Message relayed to patient's son Mir Andujar. He verbalizes understanding of patient taking two times a day.

## 2022-05-07 ENCOUNTER — TELEPHONE (OUTPATIENT)
Dept: FAMILY MEDICINE CLINIC | Facility: CLINIC | Age: 77
End: 2022-05-07

## 2022-05-07 RX ORDER — VALSARTAN 160 MG/1
160 TABLET ORAL DAILY
Qty: 30 TABLET | Refills: 0 | Status: SHIPPED | OUTPATIENT
Start: 2022-05-07 | End: 2022-05-09

## 2022-05-07 NOTE — TELEPHONE ENCOUNTER
On call provider, pt needs refill valsartan. Reviewed notes, directed to take twice daily and call next week with update. Refilled valsartan #30.

## 2022-05-09 ENCOUNTER — TELEPHONE (OUTPATIENT)
Dept: FAMILY MEDICINE CLINIC | Facility: CLINIC | Age: 77
End: 2022-05-09

## 2022-05-09 RX ORDER — VALSARTAN 160 MG/1
160 TABLET ORAL 2 TIMES DAILY
Qty: 180 TABLET | Refills: 0 | Status: SHIPPED | OUTPATIENT
Start: 2022-05-09 | End: 2022-07-11

## 2022-05-09 NOTE — TELEPHONE ENCOUNTER
Pt son Marcel Hood stated pt blood pressure has been around 175/90 the last couple of days since starting new dose. Carreno [place] states blood pressure today was 180/108. Pt son wants to know what they should do next.      Confirmed [phone] as best contact for pt son

## 2022-05-09 NOTE — TELEPHONE ENCOUNTER
Please Advise--Medication pended for once daily per note      [place] with [facility] stated they need new prescription to be sent for Valsartan. [facility] stating the prescription that was sent on Saturday would not cover. [facility] need new prescription so they can override to fill rx sooner. Chad Rodriguez stated pt told her she is supposed to take 1 tablet 2 times daily. Prescription sent on 5/7 was for 1 tablet by mouth daily.    Chad Rodriguez [phone]

## 2022-05-09 NOTE — TELEPHONE ENCOUNTER
Pt called on call provider over weekend Valsartan refilled as last written and advised Pt to contact PCP- Dr. Kimmie Lamb on Monday for dose. Pt states dose was changed but on call doctor did not have those instructions.  Pharmacy will f/u with Pt and Dr. Kimmie Lamb

## 2022-05-09 NOTE — TELEPHONE ENCOUNTER
Relayed to patient's son, he read back new recommendations. Advised patient to call back Friday with updates.

## 2022-05-09 NOTE — TELEPHONE ENCOUNTER
[facility] called to question dosage on Valsartan. Trae Daugherty handled on call [facility] 05/07/22. Patient is panicking as she is out of medication.  Please clarify

## 2022-05-09 NOTE — TELEPHONE ENCOUNTER
oSnia with [facility] stated they need new prescription to be sent for Valsartan. Per Bong Mason pt has been calling pharmacy stating the prescription that was sent on Saturday would not cover. Per Bong Mason need new prescription so they can override to fill rx sooner. Bong Mason stated pt told her she is supposed to take 1 tablet 2 times daily. Prescription sent on 5/7 was for 1 tablet by mouth daily.      Bong Mason [phone]

## 2022-05-13 NOTE — TELEPHONE ENCOUNTER
Relayed to patient referral was placed. Advised patient to call the office before making appointment for authorization status as patient does not use [employer].

## 2022-05-13 NOTE — TELEPHONE ENCOUNTER
Pt's son called to update pt's condition. Pt is not doing any better, she is experiencing foggy brain, heaviness feeling and eye burning sensation. BP has been   180/94  185/86  188/96    Pt would like to speak to nurse or have dr. Ivy Kraft further recommendations. Can she be seen today? Please advise.

## 2022-05-13 NOTE — TELEPHONE ENCOUNTER
Patient son states he doesn't think the patient's symptoms warrant the ER at this time. She is no pain, he is wondering if she should see a cardiologist. Or even going back on previous blood pressure medication enalapril and possibly increasing dose. Please advise. Thank you!

## 2022-05-20 ENCOUNTER — OFFICE VISIT (OUTPATIENT)
Dept: INTERNAL MEDICINE CLINIC | Facility: CLINIC | Age: 77
End: 2022-05-20
Payer: MEDICARE

## 2022-05-20 VITALS
WEIGHT: 189.81 LBS | HEIGHT: 60.5 IN | HEART RATE: 71 BPM | OXYGEN SATURATION: 96 % | DIASTOLIC BLOOD PRESSURE: 80 MMHG | BODY MASS INDEX: 36.3 KG/M2 | TEMPERATURE: 98 F | RESPIRATION RATE: 16 BRPM | SYSTOLIC BLOOD PRESSURE: 156 MMHG

## 2022-05-20 DIAGNOSIS — I10 ESSENTIAL HYPERTENSION: Primary | ICD-10-CM

## 2022-05-20 PROCEDURE — 3077F SYST BP >= 140 MM HG: CPT | Performed by: FAMILY MEDICINE

## 2022-05-20 PROCEDURE — 3008F BODY MASS INDEX DOCD: CPT | Performed by: FAMILY MEDICINE

## 2022-05-20 PROCEDURE — 3079F DIAST BP 80-89 MM HG: CPT | Performed by: FAMILY MEDICINE

## 2022-05-20 PROCEDURE — 99213 OFFICE O/P EST LOW 20 MIN: CPT | Performed by: FAMILY MEDICINE

## 2022-05-20 RX ORDER — CITALOPRAM 20 MG/1
TABLET ORAL
COMMUNITY
Start: 2022-05-09 | End: 2022-05-20

## 2022-06-21 ENCOUNTER — HOSPITAL ENCOUNTER (OUTPATIENT)
Dept: ULTRASOUND IMAGING | Age: 77
Discharge: HOME | End: 2022-06-21
Attending: FAMILY MEDICINE
Payer: MEDICARE

## 2022-06-21 DIAGNOSIS — I10 ESSENTIAL HYPERTENSION: ICD-10-CM

## 2022-06-21 PROCEDURE — 93975 VASCULAR STUDY: CPT | Performed by: FAMILY MEDICINE

## 2022-06-21 PROCEDURE — 76775 US EXAM ABDO BACK WALL LIM: CPT | Performed by: FAMILY MEDICINE

## 2022-06-29 ENCOUNTER — TELEPHONE (OUTPATIENT)
Dept: INTERNAL MEDICINE CLINIC | Facility: CLINIC | Age: 77
End: 2022-06-29

## 2022-06-29 DIAGNOSIS — N83.202 CYST OF LEFT OVARY: Primary | ICD-10-CM

## 2022-06-29 NOTE — TELEPHONE ENCOUNTER
Spoke with son Cady Pryor, on verbal. Relayed results of Kidney US and ovarian cyst. Son stated that his mom does not see a gyne anymore. TO, please clarify, does she need to follow up w gyne? if so, do you rec any?

## 2022-06-30 NOTE — TELEPHONE ENCOUNTER
Spoke to Ethan Mckinney (son), states mom does not want a male gyn. Requesting female only. Recommendations?

## 2022-07-11 RX ORDER — VALSARTAN 160 MG/1
TABLET ORAL
Qty: 180 TABLET | Refills: 0 | Status: SHIPPED | OUTPATIENT
Start: 2022-07-11

## 2022-07-14 RX ORDER — HYDROCHLOROTHIAZIDE 12.5 MG/1
TABLET ORAL
Qty: 90 TABLET | Refills: 0 | Status: SHIPPED | OUTPATIENT
Start: 2022-07-14

## 2022-08-03 ENCOUNTER — TELEPHONE (OUTPATIENT)
Dept: INTERNAL MEDICINE CLINIC | Facility: CLINIC | Age: 77
End: 2022-08-03

## 2022-08-03 DIAGNOSIS — I10 ESSENTIAL HYPERTENSION: ICD-10-CM

## 2022-08-03 DIAGNOSIS — E78.00 HYPERCHOLESTEROLEMIA: Primary | ICD-10-CM

## 2022-08-03 NOTE — TELEPHONE ENCOUNTER
Pts daughter scheduled cpx, please place lab orders.     Future Appointments   Date Time Provider Lul Wheeler   8/18/2022  9:30 AM Milena Comer MD [facility] [place]

## 2022-08-18 ENCOUNTER — OFFICE VISIT (OUTPATIENT)
Dept: INTERNAL MEDICINE CLINIC | Facility: CLINIC | Age: 77
End: 2022-08-18
Payer: MEDICARE

## 2022-08-18 VITALS
BODY MASS INDEX: 36.34 KG/M2 | HEART RATE: 71 BPM | WEIGHT: 190 LBS | DIASTOLIC BLOOD PRESSURE: 86 MMHG | HEIGHT: 60.5 IN | TEMPERATURE: 98 F | OXYGEN SATURATION: 96 % | SYSTOLIC BLOOD PRESSURE: 128 MMHG

## 2022-08-18 DIAGNOSIS — I10 ESSENTIAL HYPERTENSION: ICD-10-CM

## 2022-08-18 DIAGNOSIS — E55.9 VITAMIN D DEFICIENCY: ICD-10-CM

## 2022-08-18 DIAGNOSIS — R00.2 PALPITATIONS: Primary | ICD-10-CM

## 2022-08-18 DIAGNOSIS — Z86.16 HISTORY OF COVID-19: ICD-10-CM

## 2022-08-18 DIAGNOSIS — Z86.012 HISTORY OF BENIGN CARCINOID TUMOR: ICD-10-CM

## 2022-08-18 DIAGNOSIS — F41.9 ANXIETY: ICD-10-CM

## 2022-08-18 DIAGNOSIS — Z00.00 ENCOUNTER FOR ANNUAL HEALTH EXAMINATION: ICD-10-CM

## 2022-08-18 DIAGNOSIS — R19.5 LOOSE BOWEL MOVEMENT: ICD-10-CM

## 2022-08-18 DIAGNOSIS — E78.00 HYPERCHOLESTEROLEMIA: ICD-10-CM

## 2022-08-18 DIAGNOSIS — N18.31 STAGE 3A CHRONIC KIDNEY DISEASE (HCC): ICD-10-CM

## 2022-08-18 PROBLEM — R53.1 WEAKNESS GENERALIZED: Status: RESOLVED | Noted: 2021-02-25 | Resolved: 2022-08-18

## 2022-08-18 PROCEDURE — 96160 PT-FOCUSED HLTH RISK ASSMT: CPT | Performed by: FAMILY MEDICINE

## 2022-08-18 PROCEDURE — 99397 PER PM REEVAL EST PAT 65+ YR: CPT | Performed by: FAMILY MEDICINE

## 2022-08-18 PROCEDURE — G0439 PPPS, SUBSEQ VISIT: HCPCS | Performed by: FAMILY MEDICINE

## 2022-08-18 PROCEDURE — 3079F DIAST BP 80-89 MM HG: CPT | Performed by: FAMILY MEDICINE

## 2022-08-18 PROCEDURE — 3074F SYST BP LT 130 MM HG: CPT | Performed by: FAMILY MEDICINE

## 2022-08-18 PROCEDURE — 3008F BODY MASS INDEX DOCD: CPT | Performed by: FAMILY MEDICINE

## 2022-08-18 PROCEDURE — 1126F AMNT PAIN NOTED NONE PRSNT: CPT | Performed by: FAMILY MEDICINE

## 2022-08-18 RX ORDER — HYDROCHLOROTHIAZIDE 12.5 MG/1
12.5 TABLET ORAL
Qty: 90 TABLET | Refills: 0 | COMMUNITY
Start: 2022-08-18 | End: 2022-08-18

## 2022-08-18 RX ORDER — METOPROLOL TARTRATE 50 MG/1
50 TABLET, FILM COATED ORAL 2 TIMES DAILY
Qty: 180 TABLET | Refills: 1 | COMMUNITY
Start: 2022-08-18

## 2022-08-18 RX ORDER — VALSARTAN 160 MG/1
160 TABLET ORAL 2 TIMES DAILY
Qty: 180 TABLET | Refills: 1 | Status: SHIPPED | OUTPATIENT
Start: 2022-08-18

## 2022-08-18 RX ORDER — CITALOPRAM 20 MG/1
TABLET ORAL
COMMUNITY
Start: 2022-07-21

## 2022-08-18 RX ORDER — HYDROCHLOROTHIAZIDE 12.5 MG/1
12.5 TABLET ORAL
Qty: 180 TABLET | Refills: 1 | Status: SHIPPED | OUTPATIENT
Start: 2022-08-18

## 2022-08-19 ENCOUNTER — TELEPHONE (OUTPATIENT)
Dept: INTERNAL MEDICINE CLINIC | Facility: CLINIC | Age: 77
End: 2022-08-19

## 2022-08-19 DIAGNOSIS — R79.89 ELEVATED LIVER FUNCTION TESTS: Primary | ICD-10-CM

## 2022-08-19 LAB
ABSOLUTE BASOPHILS: 68 CELLS/UL (ref 0–200)
ABSOLUTE EOSINOPHILS: 203 CELLS/UL (ref 15–500)
ABSOLUTE LYMPHOCYTES: 2453 CELLS/UL (ref 850–3900)
ABSOLUTE MONOCYTES: 690 CELLS/UL (ref 200–950)
ABSOLUTE NEUTROPHILS: 4088 CELLS/UL (ref 1500–7800)
ALBUMIN/GLOBULIN RATIO: 1.4 (CALC) (ref 1–2.5)
ALBUMIN: 4.1 G/DL (ref 3.6–5.1)
ALKALINE PHOSPHATASE: 55 U/L (ref 37–153)
ALT: 32 U/L (ref 6–29)
APPEARANCE: CLEAR
AST: 41 U/L (ref 10–35)
BASOPHILS: 0.9 %
BILIRUBIN, TOTAL: 0.6 MG/DL (ref 0.2–1.2)
BILIRUBIN: NEGATIVE
BUN: 17 MG/DL (ref 7–25)
CALCIUM: 9.6 MG/DL (ref 8.6–10.4)
CARBON DIOXIDE: 26 MMOL/L (ref 20–32)
CHLORIDE: 102 MMOL/L (ref 98–110)
CHOL/HDLC RATIO: 3.1 (CALC)
CHOLESTEROL, TOTAL: 145 MG/DL
COLOR: YELLOW
CREATININE: 0.98 MG/DL (ref 0.6–1)
EGFR: 60 ML/MIN/1.73M2
EOSINOPHILS: 2.7 %
GLOBULIN: 3 G/DL (CALC) (ref 1.9–3.7)
GLUCOSE: 86 MG/DL (ref 65–99)
GLUCOSE: NEGATIVE
HDL CHOLESTEROL: 47 MG/DL
HEMATOCRIT: 35.8 % (ref 35–45)
HEMOGLOBIN A1C: 5.5 % OF TOTAL HGB
HEMOGLOBIN: 12 G/DL (ref 11.7–15.5)
KETONES: NEGATIVE
LDL-CHOLESTEROL: 75 MG/DL (CALC)
LYMPHOCYTES: 32.7 %
MCH: 31.1 PG (ref 27–33)
MCHC: 33.5 G/DL (ref 32–36)
MCV: 92.7 FL (ref 80–100)
MONOCYTES: 9.2 %
MPV: 11.2 FL (ref 7.5–12.5)
NEUTROPHILS: 54.5 %
NITRITE: NEGATIVE
NON-HDL CHOLESTEROL: 98 MG/DL (CALC)
OCCULT BLOOD: NEGATIVE
PH: 5.5 (ref 5–8)
PLATELET COUNT: 333 THOUSAND/UL (ref 140–400)
POTASSIUM: 4 MMOL/L (ref 3.5–5.3)
PROTEIN, TOTAL: 7.1 G/DL (ref 6.1–8.1)
PROTEIN: NEGATIVE
RDW: 11.9 % (ref 11–15)
RED BLOOD CELL COUNT: 3.86 MILLION/UL (ref 3.8–5.1)
SODIUM: 137 MMOL/L (ref 135–146)
SPECIFIC GRAVITY: 1.01 (ref 1–1.03)
TRIGLYCERIDES: 143 MG/DL
WHITE BLOOD CELL COUNT: 7.5 THOUSAND/UL (ref 3.8–10.8)

## 2022-08-19 NOTE — TELEPHONE ENCOUNTER
----- Message from Samia Lennon MD sent at 8/19/2022  7:24 AM CDT -----  Looks fine except slight increase in LFTs , nonspecific     rec fani AST/ALT 1 month

## 2022-08-30 RX ORDER — AMLODIPINE BESYLATE AND BENAZEPRIL HYDROCHLORIDE 5; 20 MG/1; MG/1
CAPSULE ORAL
Qty: 90 CAPSULE | Refills: 0 | OUTPATIENT
Start: 2022-08-30

## 2022-09-21 LAB
ABSOLUTE BASOPHILS: 64 CELLS/UL (ref 0–200)
ABSOLUTE EOSINOPHILS: 230 CELLS/UL (ref 15–500)
ABSOLUTE LYMPHOCYTES: 3257 CELLS/UL (ref 850–3900)
ABSOLUTE MONOCYTES: 800 CELLS/UL (ref 200–950)
ABSOLUTE NEUTROPHILS: 4848 CELLS/UL (ref 1500–7800)
ALT: 28 U/L (ref 6–29)
AST: 31 U/L (ref 10–35)
BASOPHILS: 0.7 %
EOSINOPHILS: 2.5 %
HEMATOCRIT: 37.3 % (ref 35–45)
HEMOGLOBIN: 12.6 G/DL (ref 11.7–15.5)
LYMPHOCYTES: 35.4 %
MCH: 31.4 PG (ref 27–33)
MCHC: 33.8 G/DL (ref 32–36)
MCV: 93 FL (ref 80–100)
MONOCYTES: 8.7 %
MPV: 10.9 FL (ref 7.5–12.5)
NEUTROPHILS: 52.7 %
PLATELET COUNT: 342 THOUSAND/UL (ref 140–400)
RDW: 11.9 % (ref 11–15)
RED BLOOD CELL COUNT: 4.01 MILLION/UL (ref 3.8–5.1)
TSH: 2.05 MIU/L (ref 0.4–4.5)
WHITE BLOOD CELL COUNT: 9.2 THOUSAND/UL (ref 3.8–10.8)

## 2022-09-28 ENCOUNTER — TELEPHONE (OUTPATIENT)
Dept: INTERNAL MEDICINE CLINIC | Facility: CLINIC | Age: 77
End: 2022-09-28

## 2022-09-28 RX ORDER — METOPROLOL TARTRATE 50 MG/1
50 TABLET, FILM COATED ORAL 2 TIMES DAILY
Qty: 180 TABLET | Refills: 0 | Status: SHIPPED | OUTPATIENT
Start: 2022-09-28

## 2022-09-28 NOTE — TELEPHONE ENCOUNTER
Pts son requesting refill for the pt:    metoprolol tartrate 50 MG Oral Tab    [id number] - [address] [phone], [phone]    Pts son states pt only has enough for this week, but per pharmacy next refill is not until 10/16.

## 2022-09-28 NOTE — TELEPHONE ENCOUNTER
Spoke to Waunita Lefort from [facility],  [place] pt has never had that script filled before.      Looked at refill, looks like it was added historically

## 2022-10-20 RX ORDER — VALSARTAN 160 MG/1
160 TABLET ORAL 2 TIMES DAILY
Qty: 180 TABLET | Refills: 0 | Status: SHIPPED | OUTPATIENT
Start: 2022-10-20

## 2023-01-06 RX ORDER — HYDROCHLOROTHIAZIDE 12.5 MG/1
TABLET ORAL
Qty: 180 TABLET | Refills: 0 | Status: SHIPPED | OUTPATIENT
Start: 2023-01-06

## 2023-02-17 ENCOUNTER — OFFICE VISIT (OUTPATIENT)
Dept: INTERNAL MEDICINE CLINIC | Facility: CLINIC | Age: 78
End: 2023-02-17
Payer: MEDICARE

## 2023-02-17 VITALS
SYSTOLIC BLOOD PRESSURE: 128 MMHG | WEIGHT: 188.63 LBS | DIASTOLIC BLOOD PRESSURE: 82 MMHG | HEIGHT: 60.5 IN | OXYGEN SATURATION: 96 % | HEART RATE: 77 BPM | BODY MASS INDEX: 36.08 KG/M2 | TEMPERATURE: 98 F

## 2023-02-17 DIAGNOSIS — E78.00 HYPERCHOLESTEROLEMIA: ICD-10-CM

## 2023-02-17 DIAGNOSIS — I10 ESSENTIAL HYPERTENSION: Primary | ICD-10-CM

## 2023-02-17 PROCEDURE — 3074F SYST BP LT 130 MM HG: CPT | Performed by: FAMILY MEDICINE

## 2023-02-17 PROCEDURE — 3079F DIAST BP 80-89 MM HG: CPT | Performed by: FAMILY MEDICINE

## 2023-02-17 PROCEDURE — 3008F BODY MASS INDEX DOCD: CPT | Performed by: FAMILY MEDICINE

## 2023-02-17 PROCEDURE — 99214 OFFICE O/P EST MOD 30 MIN: CPT | Performed by: FAMILY MEDICINE

## 2023-02-17 RX ORDER — HYDROXYZINE HYDROCHLORIDE 25 MG/1
TABLET, FILM COATED ORAL
COMMUNITY
Start: 2022-08-29

## 2023-02-17 RX ORDER — VALSARTAN 160 MG/1
160 TABLET ORAL 2 TIMES DAILY
Qty: 180 TABLET | Refills: 0 | Status: SHIPPED | OUTPATIENT
Start: 2023-02-17

## 2023-02-17 RX ORDER — METOPROLOL TARTRATE 50 MG/1
50 TABLET, FILM COATED ORAL 2 TIMES DAILY
Qty: 180 TABLET | Refills: 0 | Status: SHIPPED | OUTPATIENT
Start: 2023-02-17

## 2023-03-16 RX ORDER — POTASSIUM CHLORIDE 20 MEQ/1
20 TABLET, EXTENDED RELEASE ORAL DAILY
Qty: 90 TABLET | Refills: 0 | Status: SHIPPED | OUTPATIENT
Start: 2023-03-16

## 2023-03-16 NOTE — TELEPHONE ENCOUNTER
Pts eliel Chin requesting refill for pt.    potassium chloride 20 MEQ Oral Tab CR    [facility], [address] [place] [address] [phone], [phone]

## 2023-05-25 RX ORDER — METOPROLOL TARTRATE 50 MG/1
TABLET, FILM COATED ORAL
Qty: 180 TABLET | Refills: 0 | Status: SHIPPED | OUTPATIENT
Start: 2023-05-25

## 2023-06-01 RX ORDER — HYDROCHLOROTHIAZIDE 12.5 MG/1
TABLET ORAL
Qty: 180 TABLET | Refills: 0 | Status: SHIPPED | OUTPATIENT
Start: 2023-06-01

## 2023-06-22 RX ORDER — POTASSIUM CHLORIDE 20 MEQ/1
20 TABLET, EXTENDED RELEASE ORAL DAILY
Qty: 90 TABLET | Refills: 0 | Status: SHIPPED | OUTPATIENT
Start: 2023-06-22

## 2023-07-24 ENCOUNTER — TELEPHONE (OUTPATIENT)
Dept: INTERNAL MEDICINE CLINIC | Facility: CLINIC | Age: 78
End: 2023-07-24

## 2023-07-24 DIAGNOSIS — R41.3 MEMORY CHANGES: ICD-10-CM

## 2023-07-24 DIAGNOSIS — R41.89 IMPAIRMENT OF COMPREHENSION: ICD-10-CM

## 2023-07-24 DIAGNOSIS — E78.00 HYPERCHOLESTEROLEMIA: ICD-10-CM

## 2023-07-24 DIAGNOSIS — I10 ESSENTIAL HYPERTENSION: Primary | ICD-10-CM

## 2023-07-24 NOTE — TELEPHONE ENCOUNTER
Patient daughter Summer Reidr would like a call back to speak to a nurse. Summer Rios called in stating that pt lately has been very forgetful and not moving around as much. Summer Rios did not want to mention this in front at up coming appointment. Would like a call back from a nurse to further discuss. Please advise.

## 2023-07-24 NOTE — TELEPHONE ENCOUNTER
TO- OV scheduled for 7/31- daughter asking if you had any recommendations prior to appointment based on her concerns below (labs, testing, neuro consult?)    Spoke with patient's daughter Pat Wu HIPAA release). Daughter confirmed information noted from earlier call today and also shared that \"my mom just seems off- and not like she used to be- we are finding the need to repeat ourselves often with her and that information just does not seem to be registering. \"  Daughter has noticed these changes over the past year. Daughter concerned as patient's brothers (x2) both had severe cases of dementia/alzheimer. Daughter prefers to not talk about this in front of patient at [address] [facility] and asking if TO would recommend any labs/testing or even neuro consult prior to appointment.

## 2023-07-24 NOTE — TELEPHONE ENCOUNTER
Spoke with patient's daughter Edmar Guzman), shared TO's recommendations to see neurology. Provided contact information for Dr. Diaz Nunez, explained referral has been placed, and is currently in open status. Daughter verbalized understanding and will check status of referral at 7/31 OV if she has not heard from insurance prior. Also provided daughter with [employer] help desk # to provide assistance with setting up patient's [employer] account.

## 2023-07-31 ENCOUNTER — OFFICE VISIT (OUTPATIENT)
Dept: INTERNAL MEDICINE CLINIC | Facility: CLINIC | Age: 78
End: 2023-07-31
Payer: MEDICARE

## 2023-07-31 VITALS
RESPIRATION RATE: 16 BRPM | OXYGEN SATURATION: 98 % | HEART RATE: 74 BPM | BODY MASS INDEX: 36.53 KG/M2 | DIASTOLIC BLOOD PRESSURE: 76 MMHG | WEIGHT: 191 LBS | HEIGHT: 60.5 IN | SYSTOLIC BLOOD PRESSURE: 130 MMHG

## 2023-07-31 DIAGNOSIS — Z86.16 HISTORY OF COVID-19: ICD-10-CM

## 2023-07-31 DIAGNOSIS — F41.9 ANXIETY: ICD-10-CM

## 2023-07-31 DIAGNOSIS — R00.2 PALPITATIONS: ICD-10-CM

## 2023-07-31 DIAGNOSIS — Z00.00 ENCOUNTER FOR ANNUAL HEALTH EXAMINATION: ICD-10-CM

## 2023-07-31 DIAGNOSIS — Z86.012 HISTORY OF BENIGN CARCINOID TUMOR: ICD-10-CM

## 2023-07-31 DIAGNOSIS — M79.10 MUSCULAR ACHES: ICD-10-CM

## 2023-07-31 DIAGNOSIS — I10 ESSENTIAL HYPERTENSION: Primary | ICD-10-CM

## 2023-07-31 DIAGNOSIS — N18.31 STAGE 3A CHRONIC KIDNEY DISEASE (HCC): ICD-10-CM

## 2023-07-31 DIAGNOSIS — E55.9 VITAMIN D DEFICIENCY: ICD-10-CM

## 2023-07-31 DIAGNOSIS — E78.00 HYPERCHOLESTEROLEMIA: ICD-10-CM

## 2023-07-31 PROCEDURE — 1159F MED LIST DOCD IN RCRD: CPT | Performed by: FAMILY MEDICINE

## 2023-07-31 PROCEDURE — 1170F FXNL STATUS ASSESSED: CPT | Performed by: FAMILY MEDICINE

## 2023-07-31 PROCEDURE — G0439 PPPS, SUBSEQ VISIT: HCPCS | Performed by: FAMILY MEDICINE

## 2023-07-31 PROCEDURE — 3075F SYST BP GE 130 - 139MM HG: CPT | Performed by: FAMILY MEDICINE

## 2023-07-31 PROCEDURE — 1160F RVW MEDS BY RX/DR IN RCRD: CPT | Performed by: FAMILY MEDICINE

## 2023-07-31 PROCEDURE — 99213 OFFICE O/P EST LOW 20 MIN: CPT | Performed by: FAMILY MEDICINE

## 2023-07-31 PROCEDURE — 3078F DIAST BP <80 MM HG: CPT | Performed by: FAMILY MEDICINE

## 2023-07-31 PROCEDURE — 96160 PT-FOCUSED HLTH RISK ASSMT: CPT | Performed by: FAMILY MEDICINE

## 2023-07-31 PROCEDURE — 1126F AMNT PAIN NOTED NONE PRSNT: CPT | Performed by: FAMILY MEDICINE

## 2023-07-31 PROCEDURE — 3008F BODY MASS INDEX DOCD: CPT | Performed by: FAMILY MEDICINE

## 2023-08-05 LAB
ABSOLUTE BASOPHILS: 71 CELLS/UL (ref 0–200)
ABSOLUTE EOSINOPHILS: 213 CELLS/UL (ref 15–500)
ABSOLUTE LYMPHOCYTES: 2481 CELLS/UL (ref 850–3900)
ABSOLUTE MONOCYTES: 632 CELLS/UL (ref 200–950)
ABSOLUTE NEUTROPHILS: 4503 CELLS/UL (ref 1500–7800)
ALBUMIN/GLOBULIN RATIO: 1.4 (CALC) (ref 1–2.5)
ALBUMIN: 4.1 G/DL (ref 3.6–5.1)
ALKALINE PHOSPHATASE: 77 U/L (ref 37–153)
ALT: 32 U/L (ref 6–29)
APPEARANCE: CLEAR
AST: 37 U/L (ref 10–35)
BASOPHILS: 0.9 %
BILIRUBIN, TOTAL: 0.8 MG/DL (ref 0.2–1.2)
BILIRUBIN: NEGATIVE
BUN: 18 MG/DL (ref 7–25)
CALCIUM: 9.5 MG/DL (ref 8.6–10.4)
CARBON DIOXIDE: 27 MMOL/L (ref 20–32)
CHLORIDE: 99 MMOL/L (ref 98–110)
CHOL/HDLC RATIO: 3 (CALC)
CHOLESTEROL, TOTAL: 142 MG/DL
COLOR: YELLOW
CREATINE KINASE, TOTAL: 77 U/L (ref 29–143)
CREATININE: 0.82 MG/DL (ref 0.6–1)
EGFR: 74 ML/MIN/1.73M2
EOSINOPHILS: 2.7 %
GLOBULIN: 3 G/DL (CALC) (ref 1.9–3.7)
GLUCOSE: 104 MG/DL (ref 65–99)
GLUCOSE: NEGATIVE
HDL CHOLESTEROL: 48 MG/DL
HEMATOCRIT: 36.2 % (ref 35–45)
HEMOGLOBIN: 12.6 G/DL (ref 11.7–15.5)
KETONES: NEGATIVE
LDL-CHOLESTEROL: 74 MG/DL (CALC)
LEUKOCYTE ESTERASE: NEGATIVE
LYMPHOCYTES: 31.4 %
MCH: 32.1 PG (ref 27–33)
MCHC: 34.8 G/DL (ref 32–36)
MCV: 92.3 FL (ref 80–100)
MONOCYTES: 8 %
MPV: 10.8 FL (ref 7.5–12.5)
NEUTROPHILS: 57 %
NITRITE: NEGATIVE
NON-HDL CHOLESTEROL: 94 MG/DL (CALC)
OCCULT BLOOD: NEGATIVE
PH: 6.5 (ref 5–8)
PLATELET COUNT: 300 THOUSAND/UL (ref 140–400)
POTASSIUM: 3.8 MMOL/L (ref 3.5–5.3)
PROTEIN, TOTAL: 7.1 G/DL (ref 6.1–8.1)
PROTEIN: NEGATIVE
RDW: 11.9 % (ref 11–15)
RED BLOOD CELL COUNT: 3.92 MILLION/UL (ref 3.8–5.1)
RHEUMATOID FACTOR: <14 IU/ML
SED RATE BY MODIFIED$WESTERGREN: 28 MM/H
SODIUM: 136 MMOL/L (ref 135–146)
SPECIFIC GRAVITY: 1.01 (ref 1–1.03)
TRIGLYCERIDES: 115 MG/DL
TSH: 2.03 MIU/L (ref 0.4–4.5)
VITAMIN D, 1,25 (OH)2,$TOTAL: 54 PG/ML (ref 18–72)
VITAMIN D2, 1,25 (OH)2: <8 PG/ML
VITAMIN D3, 1,25 (OH)2: 54 PG/ML
WHITE BLOOD CELL COUNT: 7.9 THOUSAND/UL (ref 3.8–10.8)

## 2023-08-08 DIAGNOSIS — R79.89 ELEVATED LIVER FUNCTION TESTS: Primary | ICD-10-CM

## 2023-08-20 ENCOUNTER — HOSPITAL ENCOUNTER (OUTPATIENT)
Dept: ULTRASOUND IMAGING | Facility: HOSPITAL | Age: 78
End: 2023-08-20
Attending: FAMILY MEDICINE
Payer: MEDICARE

## 2023-08-20 ENCOUNTER — HOSPITAL ENCOUNTER (OUTPATIENT)
Dept: ULTRASOUND IMAGING | Facility: HOSPITAL | Age: 78
Discharge: HOME | End: 2023-08-20
Attending: FAMILY MEDICINE
Payer: MEDICARE

## 2023-08-20 DIAGNOSIS — R79.89 ELEVATED LIVER FUNCTION TESTS: ICD-10-CM

## 2023-08-20 PROCEDURE — 76700 US EXAM ABDOM COMPLETE: CPT | Performed by: FAMILY MEDICINE

## 2023-08-21 ENCOUNTER — TELEPHONE (OUTPATIENT)
Dept: INTERNAL MEDICINE CLINIC | Facility: CLINIC | Age: 78
End: 2023-08-21

## 2023-08-21 DIAGNOSIS — R74.8 ELEVATED LIVER ENZYMES: Primary | ICD-10-CM

## 2023-08-21 NOTE — TELEPHONE ENCOUNTER
----- Message from Cecilio Nichols MD sent at 8/21/2023 12:42 PM CDT -----  Has fatty liver that accounts for the elevated liver labs        I would fani the AST/ALT 6 mo

## 2023-09-12 RX ORDER — METOPROLOL TARTRATE 50 MG/1
50 TABLET, FILM COATED ORAL 2 TIMES DAILY
Qty: 180 TABLET | Refills: 0 | Status: SHIPPED | OUTPATIENT
Start: 2023-09-12

## 2023-09-12 RX ORDER — POTASSIUM CHLORIDE 20 MEQ/1
20 TABLET, EXTENDED RELEASE ORAL DAILY
Qty: 90 TABLET | Refills: 0 | Status: SHIPPED | OUTPATIENT
Start: 2023-09-12

## 2023-09-20 RX ORDER — POTASSIUM CHLORIDE 20 MEQ/1
20 TABLET, EXTENDED RELEASE ORAL DAILY
Qty: 90 TABLET | Refills: 0 | OUTPATIENT
Start: 2023-09-20

## 2023-09-28 RX ORDER — POTASSIUM CHLORIDE 20 MEQ/1
20 TABLET, EXTENDED RELEASE ORAL DAILY
Qty: 90 TABLET | Refills: 0 | OUTPATIENT
Start: 2023-09-28

## 2023-10-06 RX ORDER — VALSARTAN 160 MG/1
160 TABLET ORAL 2 TIMES DAILY
Qty: 180 TABLET | Refills: 1 | Status: SHIPPED | OUTPATIENT
Start: 2023-10-06

## 2023-10-06 RX ORDER — HYDROCHLOROTHIAZIDE 12.5 MG/1
12.5 TABLET ORAL 2 TIMES DAILY
Qty: 180 TABLET | Refills: 0 | Status: SHIPPED | OUTPATIENT
Start: 2023-10-06

## 2023-10-06 RX ORDER — ATORVASTATIN CALCIUM 40 MG/1
40 TABLET, FILM COATED ORAL NIGHTLY
Qty: 90 TABLET | Refills: 1 | Status: SHIPPED | OUTPATIENT
Start: 2023-10-06

## 2023-10-06 NOTE — TELEPHONE ENCOUNTER
Are you okay with filling atorvastatin? Hydrochlorothiazide 12.5 mg  Filled 6-1-23  Qty 180  0 refills  No upcoming appt. LOV 7-31-23 TO    Valsartan 160 mg  Filled 2-17-23  Qty 180  0 refills  No upcoming appt.   LOV 7-31-23 TO    Atorvastatin 40 mg  Filled 6-21-23  By [employer] 8-1-23 Lipid

## 2023-10-23 ENCOUNTER — TELEPHONE (OUTPATIENT)
Dept: INTERNAL MEDICINE CLINIC | Facility: CLINIC | Age: 78
End: 2023-10-23

## 2023-10-23 DIAGNOSIS — H35.81 RETINAL EDEMA: Primary | ICD-10-CM

## 2023-10-23 DIAGNOSIS — H25.13 NUCLEAR SCLEROTIC CATARACT OF BOTH EYES: ICD-10-CM

## 2023-10-23 DIAGNOSIS — H43.393 VITREOUS FLOATER, BILATERAL: ICD-10-CM

## 2023-10-23 DIAGNOSIS — H34.8330 BRANCH RETINAL VEIN OCCLUSION OF BOTH EYES WITH MACULAR EDEMA: ICD-10-CM

## 2023-10-23 NOTE — TELEPHONE ENCOUNTER
Received fax from:   [facility]. Dr. Yesenia Rodgers   # [phone]  Fax: [phone]    PPW In RUBEN Sykes's triage bin    Referral placed. Will monitor and fax once authorized.

## 2023-10-31 NOTE — TELEPHONE ENCOUNTER
Received message from referral dept:     Anuja Evangelista [facility] Clinical Staff  Good Morning,  The Rendering Provider, Dr. Michele Roberts, is out of network with the patient's [facility] insurance plan. I am unable to process this request at this time. Please update referral with an in network Rendering Provider and I will be happy to reprocess this referral request.  Please advise the patient to contact [facility] for the names of In Network Providers. LMTCB with son, Sheryle Din (on HIPAA). To relay message from referral dept. Will await call back.

## 2023-11-09 NOTE — TELEPHONE ENCOUNTER
Tried calling Dr. Napoles Mock office to give referral information and discuss. No answer    Received fax from:   [facility]. Dr. Ang Downing  Ph # [phone]  Fax: [phone]   Will try again. Spoke with son, Brennan Elizondo. He stated that his mom has been going to Dr. Aleksandr Edwards for over 2 years and they've never stated they don't take her insurance. Informed him I would reach out to referral dept to get clarity.      Will await message back from referral dept

## 2023-11-09 NOTE — TELEPHONE ENCOUNTER
Message from referral dept:   Jannette Carr,   I am not sure. Let me try to push it through as OON. It will go into Pending Review and can take up to 72 hours for a determination. I am attempting Authorization for OV only. It will be the Specialist office responsibility to contact [facility] for any specific procedures they are performing and need Authorization for. I will let you know when I receive a determination. Thank you!    Guilherme Miranda

## 2023-11-27 RX ORDER — METOPROLOL TARTRATE 50 MG/1
50 TABLET, FILM COATED ORAL 2 TIMES DAILY
Qty: 180 TABLET | Refills: 0 | Status: SHIPPED | OUTPATIENT
Start: 2023-11-27

## 2023-11-27 NOTE — TELEPHONE ENCOUNTER
Requesting   Name from pharmacy: METOPROLOL TARTRATE 50 MG Tablet          Will file in chart as: METOPROLOL TARTRATE 50 MG Oral Tab    Sig: TAKE 1 TABLET TWICE DAILY    Disp: 180 tablet    Refills: 3    Start: 11/25/2023    Class: Normal    Non-formulary    Last ordered: 2 months ago (9/12/2023) by Zandra Siemens, MD    Last refill: 9/14/2023    Rx #: [id number]    Hypertension Medications Protocol [id number]/25/2023 01:30 AM   Protocol Details CMP or BMP in past 12 months    Last serum creatinine< 2.0    Appointment in past 6 or next 3 months        LOV: 7/31/2023  RTC: none noted   Last Relevant Labs: 8/5/2023  Filled: 9/12/2023 #180 with 0 refills    No future appointments.

## 2023-12-13 RX ORDER — POTASSIUM CHLORIDE 20 MEQ/1
20 TABLET, EXTENDED RELEASE ORAL DAILY
Qty: 90 TABLET | Refills: 3 | Status: SHIPPED | OUTPATIENT
Start: 2023-12-13

## 2023-12-13 NOTE — TELEPHONE ENCOUNTER
Requesting    Name from pharmacy: POTASSIUM CHLORIDE ER 20 Laura         Will file in chart as: POTASSIUM CHLORIDE 20 MEQ Oral Tab CR    Sig: TAKE 1 TABLET EVERY DAY    Disp: 90 tablet    Refills: 3    Start: 12/13/2023    Class: Normal    Non-formulary    Last ordered: 3 months ago (9/12/2023) by Venkat Navarrete MD    Last refill: 9/14/2023    Rx #: [id number]     LOV: 7/31/2023  RTC: none noted   Last Relevant Labs: 8/1/2023  Filled: 9/12/2023 #90 with 0 refills    No future appointments.

## 2024-01-03 RX ORDER — HYDROCHLOROTHIAZIDE 12.5 MG/1
12.5 TABLET ORAL 2 TIMES DAILY
Qty: 180 TABLET | Refills: 0 | Status: SHIPPED | OUTPATIENT
Start: 2024-01-03

## 2024-01-03 NOTE — TELEPHONE ENCOUNTER
Requesting   Name from pharmacy: HYDROCHLOROTHIAZIDE 12.5 MG Tablet          Will file in chart as: HYDROCHLOROTHIAZIDE 12.5 MG Oral Tab    Sig: TAKE 1 TABLET TWICE DAILY    Disp: 180 tablet    Refills: 3    Start: 1/3/2024    Class: Normal    Non-formulary    Last ordered: 2 months ago (10/6/2023) by Mei Vickers MD    Last refill: 10/21/2023    Rx #: [id number]    Hypertension Medications Protocol Idqgot9401/03/2024 03:05 AM   Protocol Details CMP or BMP in past 12 months    Last serum creatinine< 2.0    Appointment in past 6 or next 3 months        LOV: 7/31/2023  RTC: none noted   Last Relevant Labs: 8/5/2023  Filled: 10/6/2023 #180 with 0 refills    No future appointments.

## 2024-01-17 ENCOUNTER — OFFICE VISIT (OUTPATIENT)
Dept: INTERNAL MEDICINE CLINIC | Facility: CLINIC | Age: 79
End: 2024-01-17
Payer: MEDICARE

## 2024-01-17 VITALS
WEIGHT: 181.19 LBS | BODY MASS INDEX: 34.66 KG/M2 | DIASTOLIC BLOOD PRESSURE: 78 MMHG | TEMPERATURE: 98 F | HEIGHT: 60.5 IN | OXYGEN SATURATION: 98 % | SYSTOLIC BLOOD PRESSURE: 132 MMHG | HEART RATE: 67 BPM

## 2024-01-17 DIAGNOSIS — J01.90 ACUTE NON-RECURRENT SINUSITIS, UNSPECIFIED LOCATION: ICD-10-CM

## 2024-01-17 DIAGNOSIS — I10 ESSENTIAL HYPERTENSION: Primary | ICD-10-CM

## 2024-01-17 PROCEDURE — 3078F DIAST BP <80 MM HG: CPT | Performed by: FAMILY MEDICINE

## 2024-01-17 PROCEDURE — 3008F BODY MASS INDEX DOCD: CPT | Performed by: FAMILY MEDICINE

## 2024-01-17 PROCEDURE — 99214 OFFICE O/P EST MOD 30 MIN: CPT | Performed by: FAMILY MEDICINE

## 2024-01-17 PROCEDURE — 3075F SYST BP GE 130 - 139MM HG: CPT | Performed by: FAMILY MEDICINE

## 2024-01-17 RX ORDER — HYDROCHLOROTHIAZIDE 12.5 MG/1
12.5 TABLET ORAL 2 TIMES DAILY
Qty: 180 TABLET | Refills: 0 | Status: SHIPPED | OUTPATIENT
Start: 2024-01-17

## 2024-01-17 RX ORDER — NITROGLYCERIN 0.4 MG/1
0.4 TABLET SUBLINGUAL EVERY 5 MIN PRN
Qty: 10 TABLET | Refills: 0 | Status: SHIPPED | OUTPATIENT
Start: 2024-01-17

## 2024-01-17 RX ORDER — POTASSIUM CHLORIDE 20 MEQ/1
20 TABLET, EXTENDED RELEASE ORAL DAILY
Qty: 90 TABLET | Refills: 3 | Status: SHIPPED | OUTPATIENT
Start: 2024-01-17

## 2024-01-17 RX ORDER — METOPROLOL TARTRATE 50 MG/1
50 TABLET, FILM COATED ORAL 2 TIMES DAILY
Qty: 180 TABLET | Refills: 0 | Status: SHIPPED | OUTPATIENT
Start: 2024-01-17

## 2024-01-17 RX ORDER — AZITHROMYCIN 250 MG/1
TABLET, FILM COATED ORAL
Qty: 6 TABLET | Refills: 0 | Status: SHIPPED | OUTPATIENT
Start: 2024-01-17 | End: 2024-01-21

## 2024-01-17 RX ORDER — CODEINE PHOSPHATE AND GUAIFENESIN 10; 100 MG/5ML; MG/5ML
5 SOLUTION ORAL EVERY 6 HOURS PRN
Qty: 120 ML | Refills: 0 | Status: SHIPPED | OUTPATIENT
Start: 2024-01-17

## 2024-01-17 RX ORDER — ATORVASTATIN CALCIUM 40 MG/1
40 TABLET, FILM COATED ORAL NIGHTLY
Qty: 90 TABLET | Refills: 0 | Status: SHIPPED | OUTPATIENT
Start: 2024-01-17

## 2024-01-17 RX ORDER — VALSARTAN 160 MG/1
160 TABLET ORAL 2 TIMES DAILY
Qty: 180 TABLET | Refills: 0 | Status: SHIPPED | OUTPATIENT
Start: 2024-01-17

## 2024-01-17 NOTE — PROGRESS NOTES
Graciela Vargas is a 78 year old female.  HPI:   1 mo ago had URI s/s    did get better but not 100%    4dago got worse    +mucous    worse w laying   no SOB    no NVD     Would like refill on her meds    is taking as directed     BP good    No CP  no SOB      Current Outpatient Medications   Medication Sig Dispense Refill    hydroCHLOROthiazide 12.5 MG Oral Tab TAKE 1 TABLET TWICE DAILY 180 tablet 0    potassium chloride 20 MEQ Oral Tab CR Take 1 tablet (20 mEq total) by mouth daily. 90 tablet 3    metoprolol tartrate 50 MG Oral Tab TAKE 1 TABLET TWICE DAILY 180 tablet 0    atorvastatin 40 MG Oral Tab Take 1 tablet (40 mg total) by mouth nightly. 90 tablet 1    valsartan 160 MG Oral Tab Take 1 tablet (160 mg total) by mouth 2 (two) times daily. 180 tablet 1    hydrOXYzine 25 MG Oral Tab       nitroGLYCERIN 0.4 MG Sublingual SL Tab Place 1 tablet (0.4 mg total) under the tongue every 5 (five) minutes as needed for Chest pain.      aspirin 81 MG Oral Tab EC Take 1 tablet (81 mg total) by mouth daily.      citalopram 20 MG Oral Tab  (Patient not taking: Reported on 1/17/2024)        Past Medical History:   Diagnosis Date    Atherosclerosis of coronary artery     Essential hypertension     Hyperlipidemia       Social History:  Social History     Socioeconomic History    Marital status:    Tobacco Use    Smoking status: Never    Smokeless tobacco: Never   Vaping Use    Vaping Use: Never used   Substance and Sexual Activity    Alcohol use: Never    Drug use: Never        REVIEW OF SYSTEMS:   GENERAL HEALTH: feels well otherwise       EXAM:   /78   Pulse 67   Temp 98.1 °F (36.7 °C) (Temporal)   Ht 5' 0.5\" (1.537 m)   Wt 181 lb 3.2 oz (82.2 kg)   SpO2 98%   BMI 34.81 kg/m²   GENERAL: well developed, well nourished,in no apparent distress  SKIN: no rashes  TMs nl  throat nl    NECK: supple,no adenopathy  LUNGS: clear to auscultation  CARDIO: RRR without murmur     ASSESSMENT AND PLAN:   1. Essential  hypertension  BP is good   Meds filled   labs are UTD    CPM   fani AWV in June 2. Acute non-recurrent sinusitis, unspecified location  D/w pt and son   zpak and cough med ordered     call if not better       - guaiFENesin-codeine (CHERATUSSIN AC) 100-10 MG/5ML Oral Solution; Take 5 mL by mouth every 6 (six) hours as needed for cough.  Dispense: 120 mL; Refill: 0     The patient indicates understanding of these issues and agrees to the plan.  .

## 2024-02-08 RX ORDER — NITROGLYCERIN 0.4 MG/1
0.4 TABLET SUBLINGUAL AS NEEDED
Qty: 25 TABLET | Refills: 3 | OUTPATIENT
Start: 2024-02-08

## 2024-02-08 RX ORDER — METOPROLOL TARTRATE 50 MG/1
50 TABLET, FILM COATED ORAL 2 TIMES DAILY
Qty: 180 TABLET | Refills: 3 | OUTPATIENT
Start: 2024-02-08

## 2024-02-08 NOTE — TELEPHONE ENCOUNTER
Requesting    Name from pharmacy: METOPROLOL TARTRATE 50 MG Tablet         Will file in chart as: METOPROLOL TARTRATE 50 MG Oral Tab    Sig: TAKE 1 TABLET TWICE DAILY    Disp: 180 tablet    Refills: 3    Start: 2/8/2024    Class: Normal    Non-formulary    Last ordered: 3 weeks ago (1/17/2024) by Mei Vickers MD    Last refill: 11/28/2023    Rx #: [id number]    Hypertension Medications Protocol [id number]/08/2024 02:22 AM   Protocol Details CMP or BMP in past 12 months    Last BP reading less than 140/90    In person appointment or virtual visit in the past 12 mos or appointment in next 3 mos    EGFRCR or GFRNAA > 50       Name from pharmacy: NITROGLYCERIN 0.4 MG Tablet Sublingual         Will file in chart as: NITROGLYCERIN 0.4 MG Sublingual SL Tab    Sig: DISSOLVE 1 TABLET UNDER THE TONGUE EVERY 5 MINUTES AS NEEDED FOR CHEST PAIN    Disp: 25 tablet    Refills: 3    Start: 2/8/2024    Class: Normal    Non-formulary    Last ordered: 3 weeks ago (1/17/2024) by Mei Vickers MD    Last refill: 1/21/2024    Rx #: [id number]     LOV: 1/17/2024  RTC: AWV June   Last Relevant Labs: 8/5/2023  Filled:   Metoprolol-1/17/2024 #180 with 0 refills  Nitroglycerin-1/17/2024 #10 with 0 refills  Future Appointments   Date Time Provider Department Center   6/12/2024 10:30 AM Mei Vickers MD [facility] [place]

## 2024-02-29 RX ORDER — ATORVASTATIN CALCIUM 40 MG/1
40 TABLET, FILM COATED ORAL NIGHTLY
Qty: 90 TABLET | Refills: 2 | Status: SHIPPED | OUTPATIENT
Start: 2024-02-29

## 2024-02-29 NOTE — TELEPHONE ENCOUNTER
Atorvastatin 40 mg  Filled 1-17-24  Qty 90  0 refills  Future Appointments   Date Time Provider Department Center   6/12/2024 10:30 AM Mei Vickers MD [facility] [place]   LOV 7-31-23 TO  Labs 8-1-23 Lipid

## 2024-03-07 NOTE — TELEPHONE ENCOUNTER
Please advise. Thank you!
Pt son Courtney Higgins (on hipaa) stated pt has been experiencing headaches since valsartan script was switched to the mail delivery pharmacy. Per Sheyla, pt wants to go back to receiving her medication from [facility]. Per Sheyla, pt thinks that the pills are different between the pharmacies. Sheyla stated that pt has 10 pills left from the script from [facility] and will be taking that specific medication.    Leonie Hoover [phone] - Sheyla requesting call back when medication has been changed and sent to [facility].     Medication pended if appropriate
Pt son Zoraida Pisano (on hipaa) stated pt has been experiencing headaches since valsartan script was switched to the mail delivery pharmacy. Per Sheyla, pt wants to go back to receiving her medication from [facility]. Per Sheyla, pt thinks that the pills are different between the pharmacies. Sheyla stated that pt has 10 pills left from the script from [facility] and will be taking that specific medication.      Zoraida Pisano [phone] - Sheyla requesting call back when medication has been changed and sent to [facility].
Relayed to [facility], verbalized understanding.
done
[Negative] : Heme/Lymph

## 2024-03-20 RX ORDER — ATORVASTATIN CALCIUM 40 MG/1
40 TABLET, FILM COATED ORAL NIGHTLY
Qty: 90 TABLET | Refills: 3 | OUTPATIENT
Start: 2024-03-20

## 2024-03-20 NOTE — TELEPHONE ENCOUNTER
Requesting    Name from pharmacy: ATORVASTATIN CALCIUM 40 MG Tablet         Will file in chart as: ATORVASTATIN 40 MG Oral Tab    Sig: TAKE 1 TABLET EVERY NIGHT    Disp: 90 tablet    Refills: 3    Start: 3/20/2024    Class: Normal    Non-formulary    Last ordered: 2 weeks ago (2/29/2024) by Mei Vickers MD    Last refill: 2/26/2024    Rx #: [id number]    Cholesterol Medication Protocol Uzuamd4003/20/2024 12:15 PM   Protocol Details ALT < 80    ALT resulted within past year    Lipid panel within past 12 months    In person appointment or virtual visit in the past 12 mos or appointment in next 3 mo           LOV: 1/17/2024  RTC: June 2024  Last Relevant Labs: 8/5/2023  Filled: 2/29/2024 #90 with 2 refills    Future Appointments   Date Time Provider Department Center   6/12/2024 10:30 AM Mei Vickers MD [facility] [place]

## 2024-04-17 RX ORDER — METOPROLOL TARTRATE 50 MG/1
50 TABLET, FILM COATED ORAL 2 TIMES DAILY
Qty: 180 TABLET | Refills: 0 | Status: SHIPPED | OUTPATIENT
Start: 2024-04-17

## 2024-04-17 NOTE — TELEPHONE ENCOUNTER
Requesting    Name from pharmacy: METOPROLOL TARTRATE 50 MG Tablet         Will file in chart as: METOPROLOL TARTRATE 50 MG Oral Tab    Sig: TAKE 1 TABLET TWICE DAILY    Disp: 180 tablet    Refills: 3    Start: 4/17/2024    Class: Normal    Non-formulary    Last ordered: 3 months ago (1/17/2024) by Mei Vickers MD    Last refill: 2/3/2024    Rx #: [id number]    Hypertension Medications Protocol [id number]/17/2024 02:52 AM   Protocol Details CMP or BMP in past 12 months    Last BP reading less than 140/90    In person appointment or virtual visit in the past 12 mos or appointment in next 3 mos    EGFRCR or GFRNAA > 50        LOV: 1/17/2024  RTC: June 2024   Last Relevant Labs: 8/5/2023  Filled: 1/17/2024 #180 with 0 refills    Future Appointments   Date Time Provider Department Center   6/12/2024 10:30 AM Mei Vickers MD [facility] [place]

## 2024-05-25 RX ORDER — HYDROCHLOROTHIAZIDE 12.5 MG/1
12.5 TABLET ORAL 2 TIMES DAILY
Qty: 180 TABLET | Refills: 0 | Status: SHIPPED | OUTPATIENT
Start: 2024-05-25

## 2024-05-25 NOTE — TELEPHONE ENCOUNTER
Hydrochlorothiazide 12.5 mg  Filled 1-17-24  Qty 180  0 refills  Future Appointments   Date Time Provider Department Center   6/6/2024  2:15 PM Mei Vickers MD [facility] [place]   LOV 1-17-24 TO

## 2024-06-06 ENCOUNTER — OFFICE VISIT (OUTPATIENT)
Dept: INTERNAL MEDICINE CLINIC | Facility: CLINIC | Age: 79
End: 2024-06-06
Payer: MEDICARE

## 2024-06-06 VITALS
HEART RATE: 80 BPM | SYSTOLIC BLOOD PRESSURE: 128 MMHG | BODY MASS INDEX: 35.12 KG/M2 | DIASTOLIC BLOOD PRESSURE: 78 MMHG | TEMPERATURE: 98 F | WEIGHT: 183.63 LBS | HEIGHT: 60.5 IN | OXYGEN SATURATION: 98 %

## 2024-06-06 DIAGNOSIS — N18.31 STAGE 3A CHRONIC KIDNEY DISEASE (HCC): Primary | ICD-10-CM

## 2024-06-06 DIAGNOSIS — F41.9 ANXIETY: ICD-10-CM

## 2024-06-06 DIAGNOSIS — Z00.00 ENCOUNTER FOR ANNUAL HEALTH EXAMINATION: ICD-10-CM

## 2024-06-06 DIAGNOSIS — Z86.012 HISTORY OF BENIGN CARCINOID TUMOR: ICD-10-CM

## 2024-06-06 DIAGNOSIS — I10 ESSENTIAL HYPERTENSION: ICD-10-CM

## 2024-06-06 DIAGNOSIS — E66.01 SEVERE OBESITY (BMI 35.0-35.9 WITH COMORBIDITY) (HCC): ICD-10-CM

## 2024-06-06 DIAGNOSIS — R00.2 PALPITATIONS: ICD-10-CM

## 2024-06-06 DIAGNOSIS — I70.0 AORTIC ATHEROSCLEROSIS (HCC): ICD-10-CM

## 2024-06-06 DIAGNOSIS — E78.00 HYPERCHOLESTEROLEMIA: ICD-10-CM

## 2024-06-06 DIAGNOSIS — Z86.16 HISTORY OF COVID-19: ICD-10-CM

## 2024-06-06 DIAGNOSIS — E55.9 VITAMIN D DEFICIENCY: ICD-10-CM

## 2024-06-06 RX ORDER — VALSARTAN 160 MG/1
160 TABLET ORAL 2 TIMES DAILY
Qty: 180 TABLET | Refills: 1 | Status: SHIPPED | OUTPATIENT
Start: 2024-06-06

## 2024-06-06 RX ORDER — VALSARTAN 160 MG/1
160 TABLET ORAL 2 TIMES DAILY
Qty: 180 TABLET | Refills: 0 | Status: CANCELLED | OUTPATIENT
Start: 2024-06-06

## 2024-06-06 NOTE — PATIENT INSTRUCTIONS
Graciela Vargas's SCREENING SCHEDULE   Tests on this list are recommended by your physician but may not be covered, or covered at this frequency, by your insurer.   Please check with your insurance carrier before scheduling to verify coverage.   PREVENTATIVE SERVICES FREQUENCY &  COVERAGE DETAILS LAST COMPLETION DATE   Diabetes Screening    Fasting Blood Sugar /  Glucose    One screening every 12 months if never tested or if previously tested but not diagnosed with pre-diabetes   One screening every 6 months if diagnosed with pre-diabetes Lab Results   Component Value Date     (H) 08/01/2023        Cardiovascular Disease Screening    Lipid Panel  Cholesterol  Lipoprotein (HDL)  Triglycerides Covered every 5 years for all Medicare beneficiaries without apparent signs or symptoms of cardiovascular disease Lab Results   Component Value Date    CHOLEST 142 08/01/2023    HDL 48 (L) 08/01/2023    LDL 74 08/01/2023    TRIG 115 08/01/2023         Electrocardiogram (EKG)   Covered if needed at Welcome to Medicare, and non-screening if indicated for medical reasons 03/07/2021      Ultrasound Screening for Abdominal Aortic Aneurysm (AAA) Covered once in a lifetime for one of the following risk factors   • Men who are 65-75 years old and have ever smoked   • Anyone with a family history -     Colorectal Cancer Screening  Covered for ages 50-85; only need ONE of the following:    Colonoscopy   Covered every 10 years    Covered every 2 years if patient is at high risk or previous colonoscopy was abnormal -    No recommendations at this time    Flexible Sigmoidoscopy   Covered every 4 years -    Fecal Occult Blood Test Covered annually -   Bone Density Screening    Bone density screening    Covered every 2 years after age 65 if diagnosed with risk of osteoporosis or estrogen deficiency.    Covered yearly for long-term glucocorticoid medication use (Steroids) No results found for this or any previous visit.      [employer]  Maintenance Due   Topic Date Due   • DEXA Scan  Never done      Pap and Pelvic    Pap   Covered every 2 years for women at normal risk; Annually if at high risk -  No recommendations at this time    Chlamydia Annually if high risk -  No recommendations at this time   Screening Mammogram    Mammogram     Recommend annually for all female patients aged 40 and older    One baseline mammogram covered for patients aged 35-39 -    No recommendations at this time    Immunizations    Influenza Covered once per flu season  Please get every year -  No recommendations at this time    Pneumococcal Each vaccine (Zadxwfi45 & Ddzyneknp38) covered once after 65 Prevnar 13: 10/06/2015    Heijfhvyn64: 05/09/2011     No recommendations at this time    Hepatitis B One screening covered for patients with certain risk factors   -  No recommendations at this time    Tetanus Toxoid Not covered by Medicare Part B unless medically necessary (cut with metal); may be covered with your pharmacy prescription benefits -    Tetanus, Diptheria and Pertusis TD and TDaP Not covered by Medicare Part B -  No recommendations at this time    Zoster Not covered by Medicare Part B; may be covered with your pharmacy  prescription benefits -  Zoster Vaccines(1 of 2) Never done     Annual Monitoring of Persistent Medications (ACE/ARB, digoxin diuretics, anticonvulsants)    Potassium Annually Lab Results   Component Value Date    K 3.8 08/01/2023         Creatinine   Annually Lab Results   Component Value Date    CREATSERUM 0.82 08/01/2023         BUN Annually Lab Results   Component Value Date    BUN 18 08/01/2023       Drug Serum Conc Annually No results found for: \"DIGOXIN\", \"DIG\", \"VALP\"

## 2024-06-06 NOTE — PROGRESS NOTES
Subjective:   Graciela Vargas is a 79 year old female who presents for a MA (Medicare Advantage) Supervisit (Once per calendar year) and scheduled follow up of multiple significant but stable problems.       History/Other:   Fall Risk Assessment:   She has been screened for Falls and is low risk.      Cognitive Assessment:   She had a completely normal cognitive assessment - see flowsheet entries     Functional Ability/Status:   Graciela Vargas has some abnormal functions as listed below:  She has Driving difficulties based on screening of functional status. She has difficulties Shopping for Groceries based on screening of functional status. She has difficulties Affording Meds based on screening of functional status. She has Hearing problems based on screening of functional status.She has Vision problems based on screening of functional status. She has problems with Memory based on screening of functional status.       Depression Screening (PHQ-2/PHQ-9): PHQ-2 SCORE: 0  , done 6/6/2024             Advanced Directives:   She does NOT have a Living Will. [Do you have a living will?: No]  She does NOT have a Power of  for Health Care. [Do you have a healthcare power of ?: No]  Discussed Advance Care Planning with patient (and family/surrogate if present). Standard forms made available to patient in After Visit Summary.      Patient Active Problem List   Diagnosis    Palpitations    History of COVID-19    Essential hypertension    Hypercholesterolemia    Anxiety    Stage 3a chronic kidney disease (HCC)    Vitamin D deficiency    History of benign carcinoid tumor    Severe obesity (BMI 35.0-35.9 with comorbidity) (HCC)    Aortic atherosclerosis (HCC)     Allergies:  She is allergic to celexa [citalopram].    Current Medications:  Outpatient Medications Marked as Taking for the 6/6/24 encounter (Office Visit) with Mei Vickers MD   Medication Sig    valsartan 160 MG Oral Tab Take 1 tablet (160 mg total)  by mouth 2 (two) times daily.    hydroCHLOROthiazide 12.5 MG Oral Tab TAKE 1 TABLET TWICE DAILY    metoprolol tartrate 50 MG Oral Tab TAKE 1 TABLET TWICE DAILY    atorvastatin 40 MG Oral Tab TAKE 1 TABLET EVERY NIGHT    nitroglycerin 0.4 MG Sublingual SL Tab Place 1 tablet (0.4 mg total) under the tongue every 5 (five) minutes as needed for Chest pain.    potassium chloride 20 MEQ Oral Tab CR Take 1 tablet (20 mEq total) by mouth daily.    guaiFENesin-codeine (CHERATUSSIN AC) 100-10 MG/5ML Oral Solution Take 5 mL by mouth every 6 (six) hours as needed for cough.    hydrOXYzine 25 MG Oral Tab     aspirin 81 MG Oral Tab EC Take 1 tablet (81 mg total) by mouth daily.       Medical History:  She  has a past medical history of Atherosclerosis of coronary artery, Essential hypertension, and Hyperlipidemia.  Surgical History:  She  has no past surgical history on file.   Family History:  Her family history is not on file.  Social History:  She  reports that she has never smoked. She has never used smokeless tobacco. She reports that she does not drink alcohol and does not use drugs.    Tobacco:  She has never smoked tobacco.    CAGE Alcohol Screen:   CAGE screening score of 0 on 6/6/2024, showing low risk of alcohol abuse.      Patient Care Team:  Mei Vickers MD as PCP - General (Family Medicine)    Review of Systems  GENERAL: feels well otherwise  SKIN: denies rash  EYES: denies blurred vision    HEENT: denies nasal congestion, sinus pain or ST  LUNGS: denies shortness of breath    CARDIOVASCULAR: denies chest pain    GI: denies abdominal pain, denies heartburn   occ loose BM   Nervous about going to [place] next week      : denies dysuria   MUSCULOSKELETAL: denies back pain  NEURO: denies headaches  PSYCHE: denies depression or anxiety  HEMATOLOGIC: denies hx of anemia  ENDOCRINE: denies thyroid history  ALL/ASTHMA: denies  asthma    Objective:   Physical Exam  General Appearance:  Alert, cooperative, no distress,  appears stated age   Head:  Normocephalic, without obvious abnormality, atraumatic                    Neck: Supple, symmetrical, trachea midline, no adenopathy;  thyroid: not enlarged, symmetric, no tenderness/mass/nodules; no  JVD        Lungs:   Clear to auscultation bilaterally, respirations unlabored   Heart:  Regular rate and rhythm, S1 and S2 normal, no murmur, rub, or gallop   Abdomen:   Soft, non-tender, bowel sounds active all four quadrants,  no masses, no organomegaly   Pelvic: Deferred   Extremities: Trace edema   Pulses: 2+ and symmetric   Skin:  no rashes    Lymph nodes: Cervical, supraclavicular nodes normal   Neurologic: Normal       /78   Pulse 80   Temp 97.8 °F (36.6 °C) (Temporal)   Ht 5' 0.5\" (1.537 m)   Wt 183 lb 9.6 oz (83.3 kg)   SpO2 98%   BMI 35.27 kg/m²  Estimated body mass index is 35.27 kg/m² as calculated from the following:    Height as of this encounter: 5' 0.5\" (1.537 m).    Weight as of this encounter: 183 lb 9.6 oz (83.3 kg).    Medicare Hearing Assessment:   Hearing Screening    Screening Method: Other         Visual Acuity:   Right Eye Visual Acuity: Corrected Right Eye Chart Acuity: 20/40   Left Eye Visual Acuity: Corrected Left Eye Chart Acuity: 20/40   Both Eyes Visual Acuity: Corrected Both Eyes Chart Acuity: 20/40            Assessment & Plan:   Graciela Vargas is a 79 year old female who presents for a Medicare Assessment.   1. Stage 3a chronic kidney disease (HCC)  Check labs         2. Palpitations  Not recenty    3. Hypercholesterolemia  Check labs      4. Essential hypertension  Stable    meds filled   check labs      - CBC With Differential With Platelet  - Comp Metabolic Panel (14)  - Lipid Panel  - Assay, Thyroid Stim Hormone  - Urinalysis with Culture Reflex    5. Vitamin D deficiency  Check labs        6. Anxiety  Stable w meds   CPM        7. History of COVID-19  No acute issues        8. History of benign carcinoid tumor  No issues at present        9.  Severe obesity (BMI 35.0-35.9 with comorbidity) (HCC)  Stable weight       10. Aortic atherosclerosis (HCC)  Noted on imaging      1. Stage 3a chronic kidney disease (HCC) (Primary)  Overview:  On old records    2. Palpitations  3. Hypercholesterolemia  4. Essential hypertension  -     CBC With Differential With Platelet  -     Comp Metabolic Panel (14)  -     Lipid Panel  -     Assay, Thyroid Stim Hormone  -     Urinalysis with Culture Reflex  5. Vitamin D deficiency  Overview:  On old records  6. Anxiety  7. History of COVID-19  8. History of benign carcinoid tumor  Overview:  ?location   2014   On old records    9. Severe obesity (BMI 35.0-35.9 with comorbidity) (HCC)  10. Aortic atherosclerosis (HCC)  Other orders  -     Valsartan; Take 1 tablet (160 mg total) by mouth 2 (two) times daily.  Dispense: 180 tablet; Refill: 1    The patient indicates understanding of these issues and agrees to the plan.  Reinforced healthy diet, lifestyle, and exercise.      No follow-ups on file.     Mei Vickers MD, 6/6/2024     Supplementary Documentation:   General Health:  In the past six months, have you lost more than 10 pounds without trying?: 2 - No  Has your appetite been poor?: No  Type of Diet: Balanced  How does the patient maintain a good energy level?: Stretching  How would you describe your daily physical activity?: Light  How would you describe your current health state?: Good  How do you maintain positive mental well-being?: Social Interaction  On a scale of 0 to 10, with 0 being no pain and 10 being severe pain, what is your pain level?: 1 - (Mild)  In the past six months, have you experienced urine leakage?: 0-No  At any time do you feel concerned for the safety/well-being of yourself and/or your children, in your home or elsewhere?: No  Have you had any immunizations at another office such as Influenza, Hepatitis B, Tetanus, or Pneumococcal?: No       Graciela Vargas's SCREENING SCHEDULE   Tests on this list  are recommended by your physician but may not be covered, or covered at this frequency, by your insurer.   Please check with your insurance carrier before scheduling to verify coverage.   PREVENTATIVE SERVICES FREQUENCY &  COVERAGE DETAILS LAST COMPLETION DATE   Diabetes Screening    Fasting Blood Sugar /  Glucose    One screening every 12 months if never tested or if previously tested but not diagnosed with pre-diabetes   One screening every 6 months if diagnosed with pre-diabetes Lab Results   Component Value Date     (H) 08/01/2023        Cardiovascular Disease Screening    Lipid Panel  Cholesterol  Lipoprotein (HDL)  Triglycerides Covered every 5 years for all Medicare beneficiaries without apparent signs or symptoms of cardiovascular disease Lab Results   Component Value Date    CHOLEST 142 08/01/2023    HDL 48 (L) 08/01/2023    LDL 74 08/01/2023    TRIG 115 08/01/2023         Electrocardiogram (EKG)   Covered if needed at Welcome to Medicare, and non-screening if indicated for medical reasons 03/07/2021      Ultrasound Screening for Abdominal Aortic Aneurysm (AAA) Covered once in a lifetime for one of the following risk factors    Men who are 65-75 years old and have ever smoked    Anyone with a family history -     Colorectal Cancer Screening  Covered for ages 50-85; only need ONE of the following:    Colonoscopy   Covered every 10 years    Covered every 2 years if patient is at high risk or previous colonoscopy was abnormal -    No recommendations at this time    Flexible Sigmoidoscopy   Covered every 4 years -    Fecal Occult Blood Test Covered annually -   Bone Density Screening    Bone density screening    Covered every 2 years after age 65 if diagnosed with risk of osteoporosis or estrogen deficiency.    Covered yearly for long-term glucocorticoid medication use (Steroids) No results found for this or any previous visit.      Health Maintenance Due   Topic Date Due    DEXA Scan  Never done       Pap and Pelvic    Pap   Covered every 2 years for women at normal risk; Annually if at high risk -  No recommendations at this time    Chlamydia Annually if high risk -  No recommendations at this time   Screening Mammogram    Mammogram     Recommend annually for all female patients aged 40 and older    One baseline mammogram covered for patients aged 35-39 -    No recommendations at this time    Immunizations    Influenza Covered once per flu season  Please get every year -  No recommendations at this time    Pneumococcal Each vaccine (Qsifzvt64 & Lwupboghz39) covered once after 65 Prevnar 13: 10/06/2015    Fvguywxlq24: 05/09/2011     No recommendations at this time    Hepatitis B One screening covered for patients with certain risk factors   -  No recommendations at this time    Tetanus Toxoid Not covered by Medicare Part B unless medically necessary (cut with metal); may be covered with your pharmacy prescription benefits -    Tetanus, Diptheria and Pertusis TD and TDaP Not covered by Medicare Part B -  No recommendations at this time    Zoster Not covered by Medicare Part B; may be covered with your pharmacy  prescription benefits -  Zoster Vaccines(1 of 2) Never done     Annual Monitoring of Persistent Medications (ACE/ARB, digoxin diuretics, anticonvulsants)    Potassium Annually Lab Results   Component Value Date    K 3.8 08/01/2023         Creatinine   Annually Lab Results   Component Value Date    CREATSERUM 0.82 08/01/2023         BUN Annually Lab Results   Component Value Date    BUN 18 08/01/2023       Drug Serum Conc Annually No results found for: \"DIGOXIN\", \"DIG\", \"VALP\"

## 2024-06-08 ENCOUNTER — LAB ENCOUNTER (OUTPATIENT)
Dept: LAB | Age: 79
End: 2024-06-08
Attending: FAMILY MEDICINE
Payer: MEDICARE

## 2024-06-08 DIAGNOSIS — E55.9 VITAMIN D DEFICIENCY: ICD-10-CM

## 2024-06-08 LAB
ALBUMIN SERPL-MCNC: 4.3 G/DL (ref 3.2–4.8)
ALBUMIN/GLOB SERPL: 1.4 {RATIO} (ref 1–2)
ALP LIVER SERPL-CCNC: 71 U/L
ALT SERPL-CCNC: 31 U/L
ANION GAP SERPL CALC-SCNC: 7 MMOL/L (ref 0–18)
AST SERPL-CCNC: 37 U/L (ref ?–34)
BASOPHILS # BLD AUTO: 0.1 X10(3) UL (ref 0–0.2)
BASOPHILS NFR BLD AUTO: 1.1 %
BILIRUB SERPL-MCNC: 1.1 MG/DL (ref 0.2–1.1)
BILIRUB UR QL: NEGATIVE
BUN BLD-MCNC: 17 MG/DL (ref 9–23)
BUN/CREAT SERPL: 17.2 (ref 10–20)
CALCIUM BLD-MCNC: 9.8 MG/DL (ref 8.7–10.4)
CHLORIDE SERPL-SCNC: 101 MMOL/L (ref 98–112)
CHOLEST SERPL-MCNC: 145 MG/DL (ref ?–200)
CK SERPL-CCNC: 102 U/L
CLARITY UR: CLEAR
CO2 SERPL-SCNC: 28 MMOL/L (ref 21–32)
CREAT BLD-MCNC: 0.99 MG/DL
DEPRECATED RDW RBC AUTO: 37.8 FL (ref 35.1–46.3)
EGFRCR SERPLBLD CKD-EPI 2021: 58 ML/MIN/1.73M2 (ref 60–?)
EOSINOPHIL # BLD AUTO: 0.32 X10(3) UL (ref 0–0.7)
EOSINOPHIL NFR BLD AUTO: 3.6 %
ERYTHROCYTE [DISTWIDTH] IN BLOOD BY AUTOMATED COUNT: 11.6 % (ref 11–15)
ERYTHROCYTE [SEDIMENTATION RATE] IN BLOOD: 29 MM/HR
FASTING PATIENT LIPID ANSWER: YES
FASTING STATUS PATIENT QL REPORTED: YES
GLOBULIN PLAS-MCNC: 3.1 G/DL (ref 2–3.5)
GLUCOSE BLD-MCNC: 99 MG/DL (ref 70–99)
GLUCOSE UR-MCNC: NORMAL MG/DL
HCT VFR BLD AUTO: 34.8 %
HDLC SERPL-MCNC: 46 MG/DL (ref 40–59)
HGB BLD-MCNC: 12.7 G/DL
HGB UR QL STRIP.AUTO: NEGATIVE
IMM GRANULOCYTES # BLD AUTO: 0.03 X10(3) UL (ref 0–1)
IMM GRANULOCYTES NFR BLD: 0.3 %
KETONES UR-MCNC: NEGATIVE MG/DL
LDLC SERPL CALC-MCNC: 80 MG/DL (ref ?–100)
LEUKOCYTE ESTERASE UR QL STRIP.AUTO: NEGATIVE
LYMPHOCYTES # BLD AUTO: 3.15 X10(3) UL (ref 1–4)
LYMPHOCYTES NFR BLD AUTO: 35.6 %
MCH RBC QN AUTO: 32.9 PG (ref 26–34)
MCHC RBC AUTO-ENTMCNC: 36.5 G/DL (ref 31–37)
MCV RBC AUTO: 90.2 FL
MONOCYTES # BLD AUTO: 0.87 X10(3) UL (ref 0.1–1)
MONOCYTES NFR BLD AUTO: 9.8 %
NEUTROPHILS # BLD AUTO: 4.38 X10 (3) UL (ref 1.5–7.7)
NEUTROPHILS # BLD AUTO: 4.38 X10(3) UL (ref 1.5–7.7)
NEUTROPHILS NFR BLD AUTO: 49.6 %
NITRITE UR QL STRIP.AUTO: NEGATIVE
NONHDLC SERPL-MCNC: 99 MG/DL (ref ?–130)
OSMOLALITY SERPL CALC.SUM OF ELEC: 284 MOSM/KG (ref 275–295)
PH UR: 6.5 [PH] (ref 5–8)
PLATELET # BLD AUTO: 294 10(3)UL (ref 150–450)
POTASSIUM SERPL-SCNC: 3.6 MMOL/L (ref 3.5–5.1)
PROT SERPL-MCNC: 7.4 G/DL (ref 5.7–8.2)
PROT UR-MCNC: NEGATIVE MG/DL
RBC # BLD AUTO: 3.86 X10(6)UL
RHEUMATOID FACT SERPL-ACNC: <3.5 IU/ML (ref ?–14)
SODIUM SERPL-SCNC: 136 MMOL/L (ref 136–145)
SP GR UR STRIP: 1.01 (ref 1–1.03)
TRIGL SERPL-MCNC: 102 MG/DL (ref 30–149)
TSI SER-ACNC: 1.56 MIU/ML (ref 0.55–4.78)
UROBILINOGEN UR STRIP-ACNC: NORMAL
VIT D+METAB SERPL-MCNC: 31.5 NG/ML (ref 30–100)
VLDLC SERPL CALC-MCNC: 16 MG/DL (ref 0–30)
WBC # BLD AUTO: 8.9 X10(3) UL (ref 4–11)

## 2024-06-08 PROCEDURE — 85025 COMPLETE CBC W/AUTO DIFF WBC: CPT | Performed by: FAMILY MEDICINE

## 2024-06-08 PROCEDURE — 81003 URINALYSIS AUTO W/O SCOPE: CPT | Performed by: FAMILY MEDICINE

## 2024-06-08 PROCEDURE — 82550 ASSAY OF CK (CPK): CPT | Performed by: FAMILY MEDICINE

## 2024-06-08 PROCEDURE — 82306 VITAMIN D 25 HYDROXY: CPT

## 2024-06-08 PROCEDURE — 80061 LIPID PANEL: CPT | Performed by: FAMILY MEDICINE

## 2024-06-08 PROCEDURE — 84443 ASSAY THYROID STIM HORMONE: CPT | Performed by: FAMILY MEDICINE

## 2024-06-08 PROCEDURE — 86431 RHEUMATOID FACTOR QUANT: CPT | Performed by: FAMILY MEDICINE

## 2024-06-08 PROCEDURE — 36415 COLL VENOUS BLD VENIPUNCTURE: CPT | Performed by: FAMILY MEDICINE

## 2024-06-08 PROCEDURE — 85652 RBC SED RATE AUTOMATED: CPT | Performed by: FAMILY MEDICINE

## 2024-06-08 PROCEDURE — 80053 COMPREHEN METABOLIC PANEL: CPT | Performed by: FAMILY MEDICINE

## 2024-07-01 RX ORDER — METOPROLOL TARTRATE 50 MG/1
50 TABLET, FILM COATED ORAL 2 TIMES DAILY
Qty: 180 TABLET | Refills: 1 | Status: SHIPPED | OUTPATIENT
Start: 2024-07-01

## 2024-08-07 RX ORDER — HYDROCHLOROTHIAZIDE 12.5 MG/1
12.5 TABLET ORAL 2 TIMES DAILY
Qty: 180 TABLET | Refills: 0 | Status: SHIPPED | OUTPATIENT
Start: 2024-08-07

## 2024-08-07 NOTE — TELEPHONE ENCOUNTER
Requesting    Name from pharmacy: HYDROCHLOROTHIAZIDE 12.5 MG Tablet         Will file in chart as: HYDROCHLOROTHIAZIDE 12.5 MG Oral Tab    Sig: TAKE 1 TABLET TWICE DAILY    Disp: 180 tablet    Refills: 3    Start: 8/7/2024    Class: Normal    Non-formulary    Last ordered: 2 months ago (5/25/2024) by Mei Vickers MD    Last refill: 5/28/2024    Rx #: [id number]    Hypertension Medications Protocol [id number]/07/2024 02:17 AM   Protocol Details CMP or BMP in past 12 months    Last BP reading less than 140/90    In person appointment or virtual visit in the past 12 mos or appointment in next 3 mos    EGFRCR or GFRNAA > 50           LOV: 6/6/2024  RTC: none noted   Last Relevant Labs: 6/8/2024  Filled: 5/25/2024 #180 with 0 refills    No future appointments.

## 2024-10-21 RX ORDER — HYDROCHLOROTHIAZIDE 12.5 MG/1
12.5 TABLET ORAL 2 TIMES DAILY
Qty: 180 TABLET | Refills: 0 | Status: SHIPPED | OUTPATIENT
Start: 2024-10-21

## 2024-11-25 RX ORDER — METOPROLOL TARTRATE 50 MG
50 TABLET ORAL 2 TIMES DAILY
Qty: 180 TABLET | Refills: 0 | Status: SHIPPED | OUTPATIENT
Start: 2024-11-25

## 2024-11-25 NOTE — TELEPHONE ENCOUNTER
Requesting   Name from pharmacy: Metoprolol Tartrate Oral Tablet 50 MG          Will file in chart as: METOPROLOL TARTRATE 50 MG Oral Tab    Sig: TAKE 1 TABLET TWICE DAILY    Disp: 180 tablet    Refills: 3    Start: 11/24/2024    Class: Normal    Non-formulary    Last ordered: 4 months ago (7/1/2024) by Mei Vickres MD    Last refill: 9/13/2024    Rx #: [id number]    Hypertension Medications Protocol Qpmodh0011/24/2024 09:50 AM   Protocol Details CMP or BMP in past 12 months    Last BP reading less than 140/90    In person appointment or virtual visit in the past 12 mos or appointment in next 3 mos    EGFRCR or GFRNAA > 50        LOV: 6/6/2024  RTC: none noted   Last Relevant Labs: 6/8/2024  Filled: 7/1/2024 #180 with 1 refills    No future appointments.

## 2024-12-09 RX ORDER — ATORVASTATIN CALCIUM 40 MG/1
40 TABLET, FILM COATED ORAL NIGHTLY
Qty: 90 TABLET | Refills: 0 | Status: SHIPPED | OUTPATIENT
Start: 2024-12-09

## 2024-12-09 NOTE — TELEPHONE ENCOUNTER
Requesting   Name from pharmacy: Atorvastatin Calcium Oral Tablet 40 MG          Will file in chart as: ATORVASTATIN 40 MG Oral Tab    Sig: TAKE 1 TABLET EVERY NIGHT    Disp: 90 tablet    Refills: 3    Start: 12/7/2024    Class: Normal    Non-formulary    Last ordered: 9 months ago (2/29/2024) by Mei Vickers MD    Last refill: 9/26/2024    Rx #: [id number]    Cholesterol Medication Protocol Becnjf1112/07/2024 01:59 AM   Protocol Details ALT < 80    ALT resulted within past year    Lipid panel within past 12 months    In person appointment or virtual visit in the past 12 mos or appointment in next 3 mos        LOV: 6/6/2024  RTC: 6 months   Last Relevant Labs: 6/8/2024  Filled: 9/25/2024 #90 with 0 refills    No future appointments.

## 2024-12-16 ENCOUNTER — TELEPHONE (OUTPATIENT)
Dept: INTERNAL MEDICINE CLINIC | Facility: CLINIC | Age: 79
End: 2024-12-16

## 2024-12-16 DIAGNOSIS — H35.81 RETINAL EDEMA: Primary | ICD-10-CM

## 2024-12-16 DIAGNOSIS — H25.13 NUCLEAR SCLEROTIC CATARACT OF BOTH EYES: ICD-10-CM

## 2024-12-16 DIAGNOSIS — H18.833 RECURRENT EROSION OF BOTH CORNEAS: ICD-10-CM

## 2024-12-16 RX ORDER — POTASSIUM CHLORIDE 1500 MG/1
20 TABLET, EXTENDED RELEASE ORAL DAILY
Qty: 90 TABLET | Refills: 3 | Status: SHIPPED | OUTPATIENT
Start: 2024-12-16

## 2024-12-16 NOTE — TELEPHONE ENCOUNTER
Referall placed with CPT codes and in-basket message sent to referral pool regarding back dating and 30 visits.     Orig ppw back in RN triage basket. Will need to be faxed with authd referral.     [facility]  Fax # [phone]

## 2024-12-16 NOTE — TELEPHONE ENCOUNTER
Requesting    Name from pharmacy: Potassium Chloride Zoe ER Oral Tablet Extended Release 20 MEQ         Will file in chart as: POTASSIUM CHLORIDE 20 MEQ Oral Tab CR    Sig: TAKE 1 TABLET EVERY DAY    Disp: 90 tablet    Refills: 3    Start: 12/15/2024    Class: Normal    Non-formulary    Last ordered: 11 months ago (1/17/2024) by Mei Vickers MD    Last refill: 10/4/2024    Rx #: [id number]     LOV: 6/6/2024  RTC: 6 months   Last Relevant Labs: 6/8/2024  Filled: 10/3/2024 #90 with 0 refills    No future appointments.

## 2024-12-16 NOTE — TELEPHONE ENCOUNTER
[facility]   [facility]   Ph: [phone]  Fax: [phone]    Patient was seen by Yaritza Spear on November 20th, 23rd, and the 27th. Patient has upcoming appt on December 30th. Fax will be coming in regarding codes provided. Ok to back date referral? Last referral provided is in closed status.

## 2024-12-18 RX ORDER — VALSARTAN 160 MG/1
160 TABLET ORAL 2 TIMES DAILY
Qty: 180 TABLET | Refills: 0 | Status: SHIPPED | OUTPATIENT
Start: 2024-12-18

## 2025-01-03 RX ORDER — HYDROCHLOROTHIAZIDE 12.5 MG/1
12.5 TABLET ORAL 2 TIMES DAILY
Qty: 180 TABLET | Refills: 0 | Status: SHIPPED | OUTPATIENT
Start: 2025-01-03

## 2025-01-03 NOTE — TELEPHONE ENCOUNTER
Requesting    Name from pharmacy: hydroCHLOROthiazide Oral Tablet 12.5 MG         Will file in chart as: HYDROCHLOROTHIAZIDE 12.5 MG Oral Tab    Sig: TAKE 1 TABLET TWICE DAILY    Disp: 180 tablet    Refills: 3    Start: 1/3/2025    Class: Normal    Non-formulary    Last ordered: 2 months ago (10/21/2024) by Mei Vickers MD    Last refill: 10/23/2024    Rx #: [id number]    Hypertension Medications Protocol Ekryfk3201/03/2025 01:36 AM   Protocol Details CMP or BMP in past 12 months    Last BP reading less than 140/90    In person appointment or virtual visit in the past 12 mos or appointment in next 3 mos    EGFRCR or GFRNAA > 50        LOV: 6/6/2024  RTC: none noted   Last Relevant Labs: 6/8/2024  Filled: 10/21/2024 #180 with 0 refills    No future appointments.

## 2025-01-30 ENCOUNTER — TELEPHONE (OUTPATIENT)
Dept: INTERNAL MEDICINE CLINIC | Facility: CLINIC | Age: 80
End: 2025-01-30

## 2025-01-30 DIAGNOSIS — Z00.00 LABORATORY EXAM ORDERED AS PART OF ROUTINE GENERAL MEDICAL EXAMINATION: Primary | ICD-10-CM

## 2025-01-30 NOTE — TELEPHONE ENCOUNTER
Pt son is calling asking for lab work to be ordered before upcoming appt.     Future Appointments   Date Time Provider Department Center   2/5/2025  1:45 PM Mei Vickers MD [facility] [place]

## 2025-01-31 ENCOUNTER — LAB ENCOUNTER (OUTPATIENT)
Dept: LAB | Age: 80
End: 2025-01-31
Attending: FAMILY MEDICINE
Payer: MEDICARE

## 2025-01-31 LAB
ALBUMIN SERPL-MCNC: 4.2 G/DL (ref 3.2–4.8)
ALBUMIN/GLOB SERPL: 1.4 {RATIO} (ref 1–2)
ALP LIVER SERPL-CCNC: 77 U/L
ALT SERPL-CCNC: 32 U/L
ANION GAP SERPL CALC-SCNC: 13 MMOL/L (ref 0–18)
AST SERPL-CCNC: 34 U/L (ref ?–34)
BASOPHILS # BLD AUTO: 0.08 X10(3) UL (ref 0–0.2)
BASOPHILS NFR BLD AUTO: 0.9 %
BILIRUB SERPL-MCNC: 0.6 MG/DL (ref 0.2–1.1)
BILIRUB UR QL STRIP.AUTO: NEGATIVE
BUN BLD-MCNC: 17 MG/DL (ref 9–23)
CALCIUM BLD-MCNC: 9.1 MG/DL (ref 8.7–10.6)
CHLORIDE SERPL-SCNC: 100 MMOL/L (ref 98–112)
CHOLEST SERPL-MCNC: 142 MG/DL (ref ?–200)
CLARITY UR REFRACT.AUTO: CLEAR
CO2 SERPL-SCNC: 26 MMOL/L (ref 21–32)
CREAT BLD-MCNC: 0.99 MG/DL
EGFRCR SERPLBLD CKD-EPI 2021: 58 ML/MIN/1.73M2 (ref 60–?)
EOSINOPHIL # BLD AUTO: 0.23 X10(3) UL (ref 0–0.7)
EOSINOPHIL NFR BLD AUTO: 2.7 %
ERYTHROCYTE [DISTWIDTH] IN BLOOD BY AUTOMATED COUNT: 12 %
FASTING PATIENT LIPID ANSWER: YES
FASTING STATUS PATIENT QL REPORTED: YES
GLOBULIN PLAS-MCNC: 3.1 G/DL (ref 2–3.5)
GLUCOSE BLD-MCNC: 88 MG/DL (ref 70–99)
GLUCOSE UR STRIP.AUTO-MCNC: NORMAL MG/DL
HCT VFR BLD AUTO: 36.6 %
HDLC SERPL-MCNC: 46 MG/DL (ref 40–59)
HGB BLD-MCNC: 12.5 G/DL
IMM GRANULOCYTES # BLD AUTO: 0.03 X10(3) UL (ref 0–1)
IMM GRANULOCYTES NFR BLD: 0.4 %
KETONES UR STRIP.AUTO-MCNC: NEGATIVE MG/DL
LDLC SERPL CALC-MCNC: 77 MG/DL (ref ?–100)
LEUKOCYTE ESTERASE UR QL STRIP.AUTO: NEGATIVE
LYMPHOCYTES # BLD AUTO: 2.73 X10(3) UL (ref 1–4)
LYMPHOCYTES NFR BLD AUTO: 32.3 %
MCH RBC QN AUTO: 31.6 PG (ref 26–34)
MCHC RBC AUTO-ENTMCNC: 34.2 G/DL (ref 31–37)
MCV RBC AUTO: 92.4 FL
MONOCYTES # BLD AUTO: 0.71 X10(3) UL (ref 0.1–1)
MONOCYTES NFR BLD AUTO: 8.4 %
NEUTROPHILS # BLD AUTO: 4.67 X10 (3) UL (ref 1.5–7.7)
NEUTROPHILS # BLD AUTO: 4.67 X10(3) UL (ref 1.5–7.7)
NEUTROPHILS NFR BLD AUTO: 55.3 %
NITRITE UR QL STRIP.AUTO: NEGATIVE
NONHDLC SERPL-MCNC: 96 MG/DL (ref ?–130)
OSMOLALITY SERPL CALC.SUM OF ELEC: 289 MOSM/KG (ref 275–295)
PH UR STRIP.AUTO: 6 [PH] (ref 5–8)
PLATELET # BLD AUTO: 302 10(3)UL (ref 150–450)
POTASSIUM SERPL-SCNC: 3.4 MMOL/L (ref 3.5–5.1)
PROT SERPL-MCNC: 7.3 G/DL (ref 5.7–8.2)
PROT UR STRIP.AUTO-MCNC: NEGATIVE MG/DL
RBC # BLD AUTO: 3.96 X10(6)UL
RBC UR QL AUTO: NEGATIVE
SODIUM SERPL-SCNC: 139 MMOL/L (ref 136–145)
SP GR UR STRIP.AUTO: 1.01 (ref 1–1.03)
TRIGL SERPL-MCNC: 100 MG/DL (ref 30–149)
TSI SER-ACNC: 1.86 UIU/ML (ref 0.55–4.78)
UROBILINOGEN UR STRIP.AUTO-MCNC: NORMAL MG/DL
VLDLC SERPL CALC-MCNC: 16 MG/DL (ref 0–30)
WBC # BLD AUTO: 8.5 X10(3) UL (ref 4–11)

## 2025-01-31 PROCEDURE — 80061 LIPID PANEL: CPT | Performed by: FAMILY MEDICINE

## 2025-01-31 PROCEDURE — 81003 URINALYSIS AUTO W/O SCOPE: CPT | Performed by: FAMILY MEDICINE

## 2025-01-31 PROCEDURE — 84443 ASSAY THYROID STIM HORMONE: CPT | Performed by: FAMILY MEDICINE

## 2025-01-31 PROCEDURE — 80053 COMPREHEN METABOLIC PANEL: CPT | Performed by: FAMILY MEDICINE

## 2025-01-31 PROCEDURE — 85025 COMPLETE CBC W/AUTO DIFF WBC: CPT | Performed by: FAMILY MEDICINE

## 2025-01-31 PROCEDURE — 36415 COLL VENOUS BLD VENIPUNCTURE: CPT | Performed by: FAMILY MEDICINE

## 2025-02-05 ENCOUNTER — OFFICE VISIT (OUTPATIENT)
Dept: INTERNAL MEDICINE CLINIC | Facility: CLINIC | Age: 80
End: 2025-02-05
Payer: MEDICARE

## 2025-02-05 VITALS
DIASTOLIC BLOOD PRESSURE: 74 MMHG | SYSTOLIC BLOOD PRESSURE: 128 MMHG | HEART RATE: 83 BPM | OXYGEN SATURATION: 98 % | BODY MASS INDEX: 34.17 KG/M2 | TEMPERATURE: 98 F | HEIGHT: 60.5 IN | WEIGHT: 178.63 LBS

## 2025-02-05 DIAGNOSIS — Z86.012 HISTORY OF BENIGN CARCINOID TUMOR: ICD-10-CM

## 2025-02-05 DIAGNOSIS — E78.00 HYPERCHOLESTEROLEMIA: ICD-10-CM

## 2025-02-05 DIAGNOSIS — R19.5 LOOSE BOWEL MOVEMENT: ICD-10-CM

## 2025-02-05 DIAGNOSIS — F41.9 ANXIETY: ICD-10-CM

## 2025-02-05 DIAGNOSIS — I10 ESSENTIAL HYPERTENSION: ICD-10-CM

## 2025-02-05 DIAGNOSIS — Z71.85 VACCINE COUNSELING: ICD-10-CM

## 2025-02-05 DIAGNOSIS — E66.01 SEVERE OBESITY (BMI 35.0-35.9 WITH COMORBIDITY) (HCC): ICD-10-CM

## 2025-02-05 DIAGNOSIS — N18.31 STAGE 3A CHRONIC KIDNEY DISEASE (HCC): ICD-10-CM

## 2025-02-05 DIAGNOSIS — E87.6 HYPOKALEMIA: ICD-10-CM

## 2025-02-05 DIAGNOSIS — E16.2 LOW BLOOD SUGAR: ICD-10-CM

## 2025-02-05 DIAGNOSIS — Z00.00 ENCOUNTER FOR ANNUAL HEALTH EXAMINATION: ICD-10-CM

## 2025-02-05 DIAGNOSIS — I70.0 AORTIC ATHEROSCLEROSIS: Primary | ICD-10-CM

## 2025-02-05 DIAGNOSIS — R00.2 PALPITATIONS: ICD-10-CM

## 2025-02-05 DIAGNOSIS — E55.9 VITAMIN D DEFICIENCY: ICD-10-CM

## 2025-02-05 PROBLEM — Z86.16 HISTORY OF COVID-19: Status: RESOLVED | Noted: 2021-02-25 | Resolved: 2025-02-05

## 2025-02-05 PROCEDURE — 99499 UNLISTED E&M SERVICE: CPT | Performed by: FAMILY MEDICINE

## 2025-02-05 PROCEDURE — 3074F SYST BP LT 130 MM HG: CPT | Performed by: FAMILY MEDICINE

## 2025-02-05 PROCEDURE — 1126F AMNT PAIN NOTED NONE PRSNT: CPT | Performed by: FAMILY MEDICINE

## 2025-02-05 PROCEDURE — 3078F DIAST BP <80 MM HG: CPT | Performed by: FAMILY MEDICINE

## 2025-02-05 PROCEDURE — 99213 OFFICE O/P EST LOW 20 MIN: CPT | Performed by: FAMILY MEDICINE

## 2025-02-05 PROCEDURE — 3008F BODY MASS INDEX DOCD: CPT | Performed by: FAMILY MEDICINE

## 2025-02-05 PROCEDURE — G0439 PPPS, SUBSEQ VISIT: HCPCS | Performed by: FAMILY MEDICINE

## 2025-02-05 PROCEDURE — 96160 PT-FOCUSED HLTH RISK ASSMT: CPT | Performed by: FAMILY MEDICINE

## 2025-02-05 PROCEDURE — 1170F FXNL STATUS ASSESSED: CPT | Performed by: FAMILY MEDICINE

## 2025-02-05 PROCEDURE — 1159F MED LIST DOCD IN RCRD: CPT | Performed by: FAMILY MEDICINE

## 2025-02-05 RX ORDER — CHLORHEXIDINE GLUCONATE ORAL RINSE 1.2 MG/ML
SOLUTION DENTAL
COMMUNITY
Start: 2025-01-10 | End: 2025-02-05

## 2025-02-05 RX ORDER — HYDROCHLOROTHIAZIDE 12.5 MG/1
12.5 TABLET ORAL DAILY
COMMUNITY
Start: 2025-02-05

## 2025-02-05 NOTE — PROGRESS NOTES
Subjective:   Graciela Vargas is a 79 year old female who presents for a MA AHA (Medicare Advantage Annual Health Assessment) and scheduled follow up of multiple significant but stable problems.       History/Other:   Fall Risk Assessment:   She has been screened for Falls and is low risk.      Cognitive Assessment:   She had a completely normal cognitive assessment - see flowsheet entries     Functional Ability/Status:   Graciela Vargas has some abnormal functions as listed below:  She has Driving difficulties based on screening of functional status. She has Meal Preparation difficulties based on screening of functional status.She has difficulties Managing Money/Bills based on screening of functional status.She has difficulties Shopping for Groceries based on screening of functional status. She has Hearing problems based on screening of functional status.She has Vision problems based on screening of functional status. She has problems with Memory based on screening of functional status.       Depression Screening (PHQ):  PHQ-2 SCORE: 0  , done 2/5/2025             Advanced Directives:   She does NOT have a Living Will. [Do you have a living will?: No]  She does NOT have a Power of  for Health Care. [Do you have a healthcare power of ?: No]  Discussed Advance Care Planning with patient (and family/surrogate if present). Standard forms made available to patient in After Visit Summary.      Patient Active Problem List   Diagnosis    Palpitations    Essential hypertension    Hypercholesterolemia    Anxiety    Stage 3a chronic kidney disease (HCC)    Vitamin D deficiency    History of benign carcinoid tumor    Severe obesity (BMI 35.0-35.9 with comorbidity) (HCC)    Aortic atherosclerosis     Allergies:  She is allergic to celexa [citalopram].    Current Medications:  Outpatient Medications Marked as Taking for the 2/5/25 encounter (Office Visit) with Mei Vickers MD   Medication Sig     hydroCHLOROthiazide 12.5 MG Oral Tab TAKE 1 TABLET TWICE DAILY    VALSARTAN 160 MG Oral Tab TAKE ONE TABLET BY MOUTH TWICE DAILY    POTASSIUM CHLORIDE 20 MEQ Oral Tab CR TAKE 1 TABLET EVERY DAY    atorvastatin 40 MG Oral Tab TAKE 1 TABLET EVERY NIGHT    METOPROLOL TARTRATE 50 MG Oral Tab TAKE 1 TABLET TWICE DAILY    nitroglycerin 0.4 MG Sublingual SL Tab Place 1 tablet (0.4 mg total) under the tongue every 5 (five) minutes as needed for Chest pain.    hydrOXYzine 25 MG Oral Tab     aspirin 81 MG Oral Tab EC Take 1 tablet (81 mg total) by mouth daily.       Medical History:  She  has a past medical history of Atherosclerosis of coronary artery, Essential hypertension, and Hyperlipidemia.  Surgical History:  She  has no past surgical history on file.   Family History:  Her family history is not on file.  Social History:  She  reports that she has never smoked. She has never used smokeless tobacco. She reports that she does not drink alcohol and does not use drugs.    Tobacco:       CAGE Alcohol Screen:   CAGE screening score of 0 on 2/5/2025, showing low risk of alcohol abuse.      Patient Care Team:  Mei Vickers MD as PCP - General (Family Medicine)    Review of Systems  GENERAL: feels well otherwise  SKIN: denies rash  EYES:  sees eye dr   gets injections    HEENT: denies nasal congestion, sinus pain or ST  LUNGS: denies shortness of breath   CARDIOVASCULAR: denies chest pain on exertion   occ palpitations but not a lot      GI: denies abdominal pain, denies heartburn   has  loose BMs for several months    no blood   no mucous    : denies dysuria,   MUSCULOSKELETAL: denies back pain  NEURO: denies headaches  PSYCHE: denies depression or anxiety  HEMATOLOGIC: denies hx of anemia  ENDOCRINE: denies thyroid history  ALL/ASTHMA: denies asthma    Objective:   Physical Exam  General Appearance:  Alert, cooperative, no distress, appears stated age   Head:  Normocephalic, without obvious abnormality, atraumatic                    Neck: Supple, symmetrical, trachea midline, no adenopathy;  thyroid: not enlarged, symmetric, no tenderness/mass/nodules; no JVD       Lungs:   Clear to auscultation bilaterally, respirations unlabored   Heart:  Regular rate and rhythm, S1 and S2 normal, no murmur, rub, or gallop   Abdomen:   Soft, non-tender, bowel sounds active all four quadrants,  no masses, no organomegaly   Pelvic: Deferred   Extremities: No  edema   Pulses: 2+ and symmetric   Skin: no rashes     Lymph nodes: Cervical, supraclavicular nodes normal   Neurologic: Normal       /74 (BP Location: Left arm, Patient Position: Sitting, Cuff Size: large)   Pulse 83   Temp 98 °F (36.7 °C) (Temporal)   Ht 5' 0.5\" (1.537 m)   Wt 178 lb 9.6 oz (81 kg)   SpO2 98%   BMI 34.31 kg/m²  Estimated body mass index is 34.31 kg/m² as calculated from the following:    Height as of this encounter: 5' 0.5\" (1.537 m).    Weight as of this encounter: 178 lb 9.6 oz (81 kg).  115/70  Medicare Hearing Assessment:   Hearing Screening    Screening Method: Other               Assessment & Plan:   Graciela Vargas is a 79 year old female who presents for a Medicare Assessment.   1. Aortic atherosclerosis  Noted on imaging      2. Severe obesity (BMI 35.0-35.9 with comorbidity) (HCC)  Down a few lbs  CPM         3. Stage 3a chronic kidney disease (HCC)  Discussed labs    stable    Keep BP controlled        4. Essential hypertension  Will lower the hctz to 12.5 daily     Otherwsie CPM w meds         5. Hypercholesterolemia  Discussed labs        6. Palpitations  On B blocker    nl exam        7. Vitamin D deficiency  Stable labs       8. Anxiety  Appears well    no issues    9. History of benign carcinoid tumor  Appears well        10. Vaccine counseling  Got flu shot at pharmacy       11   hypokalemia    Will take K BID for 2d and then daily    hydrochlorothiazide lowered    12.  Low BS      Discussed her s/s   does get tremor a bit if goes long wo eating      rec not fast for Ramadan      small meals in between       13.  Loose BM  Will ask GI to see her    no new   could be lowering the K as well        1. Aortic atherosclerosis (Primary)  2. Severe obesity (BMI 35.0-35.9 with comorbidity) (HCC)  3. Stage 3a chronic kidney disease (HCC)  Overview:  On old records    4. Essential hypertension  5. Hypercholesterolemia  6. Palpitations  7. Vitamin D deficiency  Overview:  On old records  8. Anxiety  9. History of benign carcinoid tumor  Overview:  ?location   2014   On old records    10. Vaccine counseling    The patient indicates understanding of these issues and agrees to the plan.  Discussed labs      No follow-ups on file.     Mei Vickers MD, 2/5/2025     Supplementary Documentation:   General Health:  In the past six months, have you lost more than 10 pounds without trying?: 2 - No  Has your appetite been poor?: No  Type of Diet: Low Salt  How would you describe your daily physical activity?: Moderate  How would you describe your current health state?: Fair  How do you maintain positive mental well-being?: Social Interaction  On a scale of 0 to 10, with 0 being no pain and 10 being severe pain, what is your pain level?: 0 - (None)  In the past six months, have you experienced urine leakage?: 0-No  At any time do you feel concerned for the safety/well-being of yourself and/or your children, in your home or elsewhere?: No  Have you had any immunizations at another office such as Influenza, Hepatitis B, Tetanus, or Pneumococcal?: No    Health Maintenance   Topic Date Due    Zoster Vaccines (1 of 2) Never done    DEXA Scan  Never done    COVID-19 Vaccine (1 - 2024-25 season) Never done    Influenza Vaccine (1) 10/01/2024    Annual Well Visit  01/01/2025    Annual Depression Screening  01/01/2025    Fall Risk Screening (Annual)  01/01/2025    Pneumococcal Vaccine: 50+ Years  Completed    Meningococcal B Vaccine  Aged Out

## 2025-02-06 RX ORDER — METOPROLOL TARTRATE 50 MG
50 TABLET ORAL 2 TIMES DAILY
Qty: 180 TABLET | Refills: 0 | Status: SHIPPED | OUTPATIENT
Start: 2025-02-06

## 2025-02-06 NOTE — TELEPHONE ENCOUNTER
Requesting    Name from pharmacy: Metoprolol Tartrate Oral Tablet 50 MG         Will file in chart as: METOPROLOL TARTRATE 50 MG Oral Tab    Sig: TAKE 1 TABLET TWICE DAILY    Disp: 180 tablet    Refills: 3    Start: 2/6/2025    Class: Normal    Non-formulary    Last ordered: 2 months ago (11/25/2024) by Mei Vickers MD    Last refill: 11/26/2024    Rx #: [id number]    Hypertension Medications Protocol Iwqlup1902/06/2025 02:01 AM   Protocol Details CMP or BMP in past 12 months    Last BP reading less than 140/90    In person appointment or virtual visit in the past 12 mos or appointment in next 3 mos    EGFRCR or GFRNAA > 50    Medication is active on med list        LOV: 2/6/2025  RTC: none noted   Last Relevant Labs: 1/31/2025  Filled: 11/25/2024 #180 with 0 refills    No future appointments.

## 2025-02-20 RX ORDER — ATORVASTATIN CALCIUM 40 MG/1
40 TABLET, FILM COATED ORAL NIGHTLY
Qty: 90 TABLET | Refills: 0 | Status: SHIPPED | OUTPATIENT
Start: 2025-02-20

## 2025-02-20 NOTE — TELEPHONE ENCOUNTER
Requesting    Name from pharmacy: Atorvastatin Calcium Oral Tablet 40 MG         Will file in chart as: ATORVASTATIN 40 MG Oral Tab    Sig: TAKE 1 TABLET EVERY NIGHT    Disp: 90 tablet    Refills: 3    Start: 2/20/2025    Class: Normal    Non-formulary    Last ordered: 2 months ago (12/9/2024) by Mei Vickers MD    Last refill: 12/10/2024    Rx #: [id number]    Cholesterol Medication Protocol Eezeox6302/20/2025 01:20 AM   Protocol Details ALT < 80    ALT resulted within past year    Lipid panel within past 12 months    In person appointment or virtual visit in the past 12 mos or appointment in next 3 mos    Medication is active on med list        LOV: 2/5/2025  RTC: none noted   Last Relevant Labs: 1/31/2025  Filled: 12/9/2024 #90 with 0 refills    No future appointments.

## 2025-02-21 ENCOUNTER — TELEPHONE (OUTPATIENT)
Dept: INTERNAL MEDICINE CLINIC | Facility: CLINIC | Age: 80
End: 2025-02-21

## 2025-02-21 DIAGNOSIS — R19.5 LOOSE BOWEL MOVEMENT: Primary | ICD-10-CM

## 2025-02-21 NOTE — TELEPHONE ENCOUNTER
TO: Any other recommendations for a female GI MD for this patient?     Female GI MD has no availability until 4/2025.

## 2025-02-21 NOTE — TELEPHONE ENCOUNTER
Pt daughter Luiza called to inquire if she could get an alternate female GI doctor recommendation as the one she was given a referral to won't have anything until April 16.    Luiza daughter [phone]

## 2025-02-24 NOTE — TELEPHONE ENCOUNTER
New referral placed per patient request/Dr. Vickers recommendation. Patient's son notified per verbal release.      Mei Vickers MD to [facility] Clinical Staff       2/23/25  5:25 PM  Dr Medina

## 2025-03-03 NOTE — TELEPHONE ENCOUNTER
Referral faxed to Dr. Adam MD  [phone]  Confirmation received. Patient's son notified per verbal release

## 2025-03-17 RX ORDER — HYDROCHLOROTHIAZIDE 12.5 MG/1
12.5 TABLET ORAL 2 TIMES DAILY
Qty: 180 TABLET | Refills: 3 | Status: SHIPPED | OUTPATIENT
Start: 2025-03-17

## 2025-03-17 NOTE — TELEPHONE ENCOUNTER
Requesting    Name from pharmacy: hydroCHLOROthiazide Oral Tablet 12.5 MG         Will file in chart as: HYDROCHLOROTHIAZIDE 12.5 MG Oral Tab    Sig: TAKE 1 TABLET TWICE DAILY    Disp: 180 tablet    Refills: 3    Start: 3/17/2025    Class: Normal    Non-formulary    Last ordered: 1 month ago (2/5/2025) by Mei Vickers MD    Last refill: 1/6/2025    Rx #: [id number]    Hypertension Medications Protocol Tplunh2903/17/2025 02:04 AM   Protocol Details Medication is active on med list    CMP or BMP in past 12 months    Last BP reading less than 140/90    In person appointment or virtual visit in the past 12 mos or appointment in next 3 mos    EGFRCR or GFRNAA > 50        LOV: 2/5/2025  RTC: none noted   Last Relevant Labs: 1/31/2025  Filled: 1/3/2025 #180 with 0 refills    Future Appointments   Date Time Provider Department Center   4/30/2025  9:40 AM Leatha Garcia DO [facility]

## 2025-04-04 RX ORDER — VALSARTAN 160 MG/1
160 TABLET ORAL 2 TIMES DAILY
Qty: 180 TABLET | Refills: 0 | Status: SHIPPED | OUTPATIENT
Start: 2025-04-04

## 2025-04-04 NOTE — TELEPHONE ENCOUNTER
Requesting    Name from pharmacy: Valsartan Oral Tablet 160 MG         Will file in chart as: VALSARTAN 160 MG Oral Tab    Sig: TAKE ONE TABLET BY MOUTH TWICE DAILY    Disp: 180 tablet    Refills: 0    Start: 4/4/2025    Class: Normal    Non-formulary    Last ordered: 3 months ago (12/18/2024) by Mei Vickers MD    Last refill: 12/18/2024    Rx #: [id number]    Hypertension Medications Protocol Ggehle1504/04/2025 11:49 AM   Protocol Details CMP or BMP in past 12 months    Last BP reading less than 140/90    In person appointment or virtual visit in the past 12 mos or appointment in next 3 mos    EGFRCR or GFRNAA > 50    Medication is active on med list        LOV: 2/5/2025  RTC: none noted   Last Relevant Labs: 1/31/2025  Filled: 12/18/2024 #180 with 0 refills    Future Appointments   Date Time Provider Department Center   4/30/2025  9:40 AM Leatha Garcia DO [facility]

## 2025-04-08 RX ORDER — VALSARTAN 160 MG/1
160 TABLET ORAL 2 TIMES DAILY
Qty: 180 TABLET | Refills: 0 | OUTPATIENT
Start: 2025-04-08

## 2025-04-21 RX ORDER — METOPROLOL TARTRATE 50 MG
50 TABLET ORAL 2 TIMES DAILY
Qty: 180 TABLET | Refills: 0 | Status: SHIPPED | OUTPATIENT
Start: 2025-04-21

## 2025-04-21 NOTE — TELEPHONE ENCOUNTER
Requesting   Name from pharmacy: Metoprolol Tartrate Oral Tablet 50 MG          Will file in chart as: METOPROLOL TARTRATE 50 MG Oral Tab    Sig: TAKE 1 TABLET TWICE DAILY    Disp: 180 tablet    Refills: 3    Start: 4/20/2025    Class: Normal    Non-formulary    Last ordered: 2 months ago (2/6/2025) by Mei Vickers MD    Last refill: 2/10/2025    Rx #: [id number]    Hypertension Medications Protocol Corlom6304/20/2025 03:04 PM   Protocol Details CMP or BMP in past 12 months    Last BP reading less than 140/90    In person appointment or virtual visit in the past 12 mos or appointment in next 3 mos    EGFRCR or GFRNAA > 50    Medication is active on med list        LOV: 2/5/2025  RTC: 6 months  Last Relevant Labs: 1/31/2025  Filled: 2/6/2025 #180 with 0 refills    Future Appointments   Date Time Provider Department Center   4/30/2025  9:40 AM Leatha Garcia DO [facility]

## 2025-04-30 PROBLEM — D64.9 ANEMIA: Status: ACTIVE | Noted: 2025-01-10

## 2025-04-30 PROBLEM — E78.5 HYPERLIPIDEMIA: Status: ACTIVE | Noted: 2022-06-01

## 2025-04-30 PROBLEM — E78.1 HIGH TRIGLYCERIDES: Status: ACTIVE | Noted: 2022-06-02

## 2025-04-30 PROBLEM — I10 HYPERTENSION: Status: ACTIVE | Noted: 2022-06-01

## 2025-05-05 RX ORDER — ATORVASTATIN CALCIUM 40 MG/1
40 TABLET, FILM COATED ORAL NIGHTLY
Qty: 90 TABLET | Refills: 0 | Status: SHIPPED | OUTPATIENT
Start: 2025-05-05

## 2025-05-05 NOTE — TELEPHONE ENCOUNTER
Requesting    Name from pharmacy: Atorvastatin Calcium Oral Tablet 40 MG         Will file in chart as: ATORVASTATIN 40 MG Oral Tab    Sig: TAKE 1 TABLET EVERY NIGHT    Disp: 90 tablet    Refills: 3    Start: 5/4/2025    Class: Normal    Non-formulary    Last ordered: 2 months ago (2/20/2025) by Mei Vickers MD    Last refill: 2/24/2025    Rx #: [id number]    Cholesterol Medication Protocol Gzvshc6905/04/2025 05:08 PM   Protocol Details ALT < 80    ALT resulted within past year    Lipid panel within past 12 months    In person appointment or virtual visit in the past 12 mos or appointment in next 3 mos    Medication is active on med list        LOV: 2/5/2025  RTC: none noted   Last Relevant Labs: 1/31/2025  Filled: 2/20/2025 #90 with 0 refills    Future Appointments   Date Time Provider Department Center   7/31/2025 10:40 AM Leatha Garcia DO [facility]

## 2025-05-12 ENCOUNTER — TELEPHONE (OUTPATIENT)
Dept: INTERNAL MEDICINE CLINIC | Facility: CLINIC | Age: 80
End: 2025-05-12

## 2025-05-12 NOTE — TELEPHONE ENCOUNTER
Spoke to patient's son Brad who reports patient had elevated pressure for two days. BP readings were 179/90 on 4/8 and 4/9.     Patient denies any headaches, vision changes and states she \"feels fine\". Patient denies missing any blood pressure medication doses.     Relevant active meds -   Metoprolol Tartrate 50 mg BID   Hydrochlorothiazide 12.5mg Daily  (medication schedule changes from BID to Daily on 2/5/25    LOV -   4. Essential hypertension  Will lower the hctz to 12.5 daily     Mariana CPM w meds     Any medication changes for BP? Monitor and report readings?

## 2025-05-19 ENCOUNTER — HOSPITAL ENCOUNTER (OUTPATIENT)
Age: 80
Discharge: HOME | End: 2025-05-19
Attending: EMERGENCY MEDICINE
Payer: MEDICARE

## 2025-05-19 VITALS
OXYGEN SATURATION: 98 % | RESPIRATION RATE: 18 BRPM | HEART RATE: 73 BPM | TEMPERATURE: 97 F | WEIGHT: 175 LBS | SYSTOLIC BLOOD PRESSURE: 157 MMHG | HEIGHT: 60 IN | BODY MASS INDEX: 34.36 KG/M2 | DIASTOLIC BLOOD PRESSURE: 79 MMHG

## 2025-05-19 DIAGNOSIS — R29.898 WEAKNESS OF BOTH LOWER EXTREMITIES: ICD-10-CM

## 2025-05-19 DIAGNOSIS — I10 HYPERTENSION, UNSPECIFIED TYPE: Primary | ICD-10-CM

## 2025-05-19 DIAGNOSIS — E87.6 HYPOKALEMIA: ICD-10-CM

## 2025-05-19 LAB
#MXD IC: 1 X10ˆ3/UL (ref 0.1–1)
BILIRUB UR QL STRIP: NEGATIVE
BUN BLD-MCNC: 18 MG/DL (ref 7–18)
CHLORIDE BLD-SCNC: 99 MMOL/L (ref 98–112)
CLARITY UR: CLEAR
CO2 BLD-SCNC: 22 MMOL/L (ref 21–32)
COLOR UR: YELLOW
CREAT BLD-MCNC: 1.1 MG/DL (ref 0.55–1.02)
EGFRCR SERPLBLD CKD-EPI 2021: 51 ML/MIN/1.73M2 (ref 60–?)
GLUCOSE BLD-MCNC: 112 MG/DL (ref 70–99)
GLUCOSE UR STRIP-MCNC: NEGATIVE MG/DL
HCT VFR BLD AUTO: 37.9 % (ref 35–48)
HCT VFR BLD CALC: 38 % (ref 34–50)
HGB BLD-MCNC: 13 G/DL (ref 12–16)
HGB UR QL STRIP: NEGATIVE
ISTAT IONIZED CALCIUM FOR CHEM 8: 1.2 MMOL/L (ref 1.12–1.32)
KETONES UR STRIP-MCNC: NEGATIVE MG/DL
LYMPHOCYTES # BLD AUTO: 3.3 X10ˆ3/UL (ref 1–4)
LYMPHOCYTES NFR BLD AUTO: 34.6 %
MCH RBC QN AUTO: 31 PG (ref 26–34)
MCHC RBC AUTO-ENTMCNC: 34.3 G/DL (ref 31–37)
MCV RBC AUTO: 90.5 FL (ref 80–100)
MIXED CELL %: 10.6 %
NEUTROPHILS # BLD AUTO: 5.2 X10ˆ3/UL (ref 1.5–7.7)
NEUTROPHILS NFR BLD AUTO: 54.8 %
NITRITE UR QL STRIP: NEGATIVE
PH UR STRIP: 5.5 [PH]
PLATELET # BLD AUTO: 294 X10ˆ3/UL (ref 150–450)
POTASSIUM BLD-SCNC: 3.5 MMOL/L (ref 3.6–5.1)
PROT UR STRIP-MCNC: NEGATIVE MG/DL
RBC # BLD AUTO: 4.19 X10ˆ6/UL (ref 3.8–5.3)
SODIUM BLD-SCNC: 136 MMOL/L (ref 136–145)
SP GR UR STRIP: 1.02
UROBILINOGEN UR STRIP-ACNC: <2 MG/DL
WBC # BLD AUTO: 9.5 X10ˆ3/UL (ref 4–11)

## 2025-05-19 PROCEDURE — 93010 ELECTROCARDIOGRAM REPORT: CPT

## 2025-05-19 PROCEDURE — 36415 COLL VENOUS BLD VENIPUNCTURE: CPT

## 2025-05-19 PROCEDURE — 87086 URINE CULTURE/COLONY COUNT: CPT | Performed by: EMERGENCY MEDICINE

## 2025-05-19 PROCEDURE — 80047 BASIC METABLC PNL IONIZED CA: CPT

## 2025-05-19 PROCEDURE — 93005 ELECTROCARDIOGRAM TRACING: CPT

## 2025-05-19 PROCEDURE — 99215 OFFICE O/P EST HI 40 MIN: CPT

## 2025-05-19 PROCEDURE — 81002 URINALYSIS NONAUTO W/O SCOPE: CPT

## 2025-05-19 PROCEDURE — 99214 OFFICE O/P EST MOD 30 MIN: CPT

## 2025-05-19 PROCEDURE — 85025 COMPLETE CBC W/AUTO DIFF WBC: CPT | Performed by: EMERGENCY MEDICINE

## 2025-05-19 PROCEDURE — 99204 OFFICE O/P NEW MOD 45 MIN: CPT

## 2025-05-19 RX ORDER — POTASSIUM CHLORIDE 1500 MG/1
40 TABLET, EXTENDED RELEASE ORAL ONCE
Status: COMPLETED | OUTPATIENT
Start: 2025-05-19 | End: 2025-05-19

## 2025-05-19 NOTE — ED PROVIDER NOTES
Patient Seen in: [facility]      History     Chief Complaint   Patient presents with    Hypertension     Stated Complaint: High Blood Pressure; Feet Shaking    Subjective:   HPI  History of Present Illness            80-year-old with a history of coronary artery disease, hypertension, high cholesterol presents with her daughter-in-law for evaluation of leg weakness and high blood pressure.  Patient's daughter states that her blood pressure was high all week. She feels her legs are weak bilaterally.  Feels they are shaking internally, although family states they do not see any shaking externally.  Sometimes she feels like \"something is walking across the top of my head on the inside\" but it isn't painful, no headache. Also had some diarrhea the past 3-4 days. No black or bloody stools. No fever. No abdominal pain or back pain. No dysuria, hematuria. Was referred for a colonoscopy for the diarrhea - hasn't scheduled yet. No chest pain or shortness of breath. No weakness or numbness in her arms.  TSH was normal in January  Objective:     Past Medical History:    Atherosclerosis of coronary artery    Essential hypertension    Hyperlipidemia              History reviewed. No pertinent surgical history.             Social History     Socioeconomic History    Marital status:    Tobacco Use    Smoking status: Never    Smokeless tobacco: Never   Vaping Use    Vaping status: Never Used   Substance and Sexual Activity    Alcohol use: Never    Drug use: Never     Social Drivers of Health      [facility]              Review of Systems    Positive for stated complaint: High Blood Pressure; Feet Shaking  Other systems are as noted in HPI.  Constitutional and vital signs reviewed.      All other systems reviewed and negative except as noted above.                  Physical Exam     ED Triage Vitals [05/19/25 1656]   /79   Pulse 73   Resp 18   Temp 97.2 °F (36.2 °C)    Temp src Oral   SpO2 98 %   O2 Device None (Room air)       Current Vitals:   Vital Signs  BP: 157/79  Pulse: 73  Resp: 18  Temp: 97.2 °F (36.2 °C)  Temp src: Oral    Oxygen Therapy  SpO2: 98 %  O2 Device: None (Room air)          Physical Exam  General: Patient is awake, alert in no acute distress.   HEENT:  Pupils are equal and round . Extraocular muscles are intact.  Sclera are not icteric.  Conjunctivae within normal limits.  Mucous members are moist.   Cardiovascular: Regular rate and rhythm, normal S1-S2.  Respiratory: Lungs are clear to auscultation bilaterally.   Extremities: No edema.  Neuro: Cranial nerves II through XII are intact bilaterally.  No dysarthria or aphasia.  Normal strength and sensation bilateral UE and LE.  Patient is alert and answers questions appropriately          ED Course     Labs Reviewed   [facility] POCT URINALYSIS DIPSTICK - Abnormal; Notable for the following components:       Result Value    Leukocyte esterase urine Small (*)     All other components within normal limits   POCT ISTAT CHEM8 CARTRIDGE - Abnormal; Notable for the following components:    ISTAT Potassium 3.5 (*)     ISTAT Glucose 112 (*)     ISTAT Creatinine 1.10 (*)     eGFR-Cr 51 (*)     All other components within normal limits   POCT CBC   URINE CULTURE, ROUTINE     EKG    Rate, intervals and axes as noted on EKG Report.  Rate: 68  Rhythm: Sinus Rhythm  Reading: Minimal voltage criteria for LVH.  No ST elevation or depression.  No dysrhythmia.  Normal intervals including QTc 412         Results                              MDM      Workup reassuring including CBC, Chem-8 and EKG. Urine dip not overtly consistent with infection - will send for culture    Refer to PCP for follow-up and ED precautions discussed    Medical Decision Making        Disposition and Plan     Clinical Impression:  1. Hypertension, unspecified type    2. Weakness of both lower extremities    3. Hypokalemia          Disposition:  Discharge  5/19/2025  6:16 pm    Follow-up:  No follow-up provider specified.        Medications Prescribed:  Discharge Medication List as of 5/19/2025  6:16 PM                Supplementary Documentation:

## 2025-05-19 NOTE — DISCHARGE INSTRUCTIONS
Focus on potassium rich foods and follow-up with your primary in a week    Return for new or worsening symptoms such as increased weakness, fever, confusion

## 2025-05-21 ENCOUNTER — TELEPHONE (OUTPATIENT)
Dept: INTERNAL MEDICINE CLINIC | Facility: CLINIC | Age: 80
End: 2025-05-21

## 2025-05-21 LAB
ATRIAL RATE: 68 BPM
P AXIS: 39 DEGREES
P-R INTERVAL: 208 MS
Q-T INTERVAL: 388 MS
QRS DURATION: 80 MS
QTC CALCULATION (BEZET): 412 MS
R AXIS: -13 DEGREES
T AXIS: 52 DEGREES
VENTRICULAR RATE: 68 BPM

## 2025-05-21 NOTE — TELEPHONE ENCOUNTER
Spoke with patient's son, Brad. Pt's BP has been elevated about two weeks ago for a few days. But then numbers went down and she was fine. Then about a couple days ago, they started to go up again. Pt's BP today was 185/115. Pt was seen in  on 5/19 and BP was 140/80, was found to have hypokalemia so potassium was given. Pt denies chest pain, shortness of breath, vision changes, severe headache. She only feels shaky.  No other symptoms, she feels like her body is shaking. Pt takes her BP medications consistently, roughly around the same time every day.     This RN advised patient to go to ER if symptoms worsen. Will follow-up with PCP to see if they have any other recommendations to help with BP.     TO: Pt scheduled to come in today for elevated BP, OK to keep appt? Also, pt wondering if she should take an extra dose of a BP medication to help bring down BP? Please advise, TY!    Future Appointments   Date Time Provider Department Center   5/21/2025  4:45 PM Mei Vickers MD [facility] [place]   7/31/2025 10:40 AM Leatha Garcia DO [facility]

## 2025-05-21 NOTE — TELEPHONE ENCOUNTER
Spoke with PCP to discuss plan of care. PCP would like to reschedule patient for BP evaluation tomorrow.     This RN called pt's son, Brad, to relay recommendation. Advised pt's son that if pt starts to experience worsening symptoms to go to ER. Pt's son v/u.     TO: Pt rescheduled.    Future Appointments   Date Time Provider Department Center   5/22/2025 11:00 AM Mei Vickers MD [facility] [place]   7/31/2025 10:40 AM Leatha Garcia DO [facility]

## 2025-05-22 ENCOUNTER — OFFICE VISIT (OUTPATIENT)
Dept: INTERNAL MEDICINE CLINIC | Facility: CLINIC | Age: 80
End: 2025-05-22
Payer: MEDICARE

## 2025-05-22 VITALS
OXYGEN SATURATION: 95 % | DIASTOLIC BLOOD PRESSURE: 82 MMHG | RESPIRATION RATE: 13 BRPM | SYSTOLIC BLOOD PRESSURE: 122 MMHG | HEART RATE: 65 BPM | TEMPERATURE: 97 F | WEIGHT: 179.38 LBS | BODY MASS INDEX: 35 KG/M2

## 2025-05-22 DIAGNOSIS — K52.9 CHRONIC DIARRHEA: ICD-10-CM

## 2025-05-22 DIAGNOSIS — I10 BENIGN ESSENTIAL HYPERTENSION: Primary | ICD-10-CM

## 2025-05-22 PROCEDURE — 3079F DIAST BP 80-89 MM HG: CPT | Performed by: FAMILY MEDICINE

## 2025-05-22 PROCEDURE — 1159F MED LIST DOCD IN RCRD: CPT | Performed by: FAMILY MEDICINE

## 2025-05-22 PROCEDURE — 3074F SYST BP LT 130 MM HG: CPT | Performed by: FAMILY MEDICINE

## 2025-05-22 PROCEDURE — 99214 OFFICE O/P EST MOD 30 MIN: CPT | Performed by: FAMILY MEDICINE

## 2025-05-22 NOTE — PROGRESS NOTES
The following individual(s) verbally consented to be recorded using ambient AI listening technology and understand that they can each withdraw their consent to this listening technology at any point by asking the clinician to turn off or pause the recording:    Patient name: Graciela Vargas  Additional names:  son      History of Present Illness  Graciela Vargas is a 80 year old female with hypertension who presents with elevated blood pressure readings.    She has experienced elevated blood pressure readings for the past month, with particularly high readings over the past week. Two weeks ago, her blood pressure reached 105 diastolic, and recently spiked to 189/115, causing her to feel panicked. It later decreased to 122/65 the following night. No changes in diet, caffeine intake, or medication regimen are reported.  Does worry a lot  She is currently taking hydrochlorothiazide, metoprolol 50 mg twice daily, and valsartan 160 mg for hypertension.    She experiences headaches, describing them as feeling like 'somebody punches me,' but denies having a headache at the time of the visit. No recent infections, colds, or head injuries are reported.    She has a history of worrying about her family, including her ten grandchildren, which may contribute to her stress levels. She lives in a ranch-style home with no stairs.    Physical Exam  Vitals:    05/22/25 1052 05/22/25 1125   BP: 130/64 122/82   Pulse: 65    Resp: 13    Temp: 97 °F (36.1 °C)    TempSrc: Temporal    SpO2: 95%    Weight: 179 lb 6.4 oz (81.4 kg)          Body mass index is 35.04 kg/m².       GENERAL: well developed, well nourished,in no apparent distress  SKIN: no rashes  NECK: supple,no adenopathy  LUNGS: clear to auscultation  CARDIO: RRR without murmur  EXTREMITIES: no edema  Results  LABS  Urinalysis: No infection (05/21/2025)    Assessment & Plan  Hypertension  Intermittent elevated blood pressure with recent high of 189/115 mmHg. No medication  or lifestyle changes. Monitoring to avoid hypotension risk.  - Monitor blood pressure regularly with proper technique.  - Continue hydrochlorothiazide, metoprolol, and valsartan.  - Reassess if blood pressure remains elevated or symptoms develop.    Colonoscopy scheduled  Colonoscopy scheduled for July 31st due to ongoing diarrhea.  - Confirm appointment and preparation instructions.  - Monitor symptoms and report changes.

## 2025-05-27 ENCOUNTER — TELEPHONE (OUTPATIENT)
Dept: INTERNAL MEDICINE CLINIC | Facility: CLINIC | Age: 80
End: 2025-05-27

## 2025-05-27 NOTE — TELEPHONE ENCOUNTER
TO: Please see below. Should we order lab h. pylori test for patient? Patient has appt 5/28/2025 with you. Please advise. TY    Future Appointments   Date Time Provider Department Center   5/28/2025 10:30 AM Mei Vickers MD [facility] [place]   7/31/2025 10:40 AM Leatha Garcia DO [facility]     Spoke with Delio, son of patient per verbal release. Patient has had diarrhea for the past 5-10 years. Colonoscopy scheduled. Family history of H. Pylori. Patient not wanting to have colonoscopy done. Son is concerned H. Pylori is cause of persistent diarrhea over the years. No other symptoms present. Patient's son is asking if she still needs to be seen in office.

## 2025-05-27 NOTE — TELEPHONE ENCOUNTER
Noted. Patient will speak at OV     Future Appointments   Date Time Provider Department Center   5/28/2025 10:30 AM Mei Vickers MD [facility] [place]   7/31/2025 10:40 AM Leatha Garcia DO [facility]

## 2025-05-27 NOTE — TELEPHONE ENCOUNTER
Pt son says pt keeps experiencing diarrhea and is wondering if H. Pylori test is available in office?    They have an appt w/ TO tmrw at 10:30 am.    Please advice.

## 2025-05-28 ENCOUNTER — LAB ENCOUNTER (OUTPATIENT)
Dept: LAB | Age: 80
End: 2025-05-28
Attending: FAMILY MEDICINE
Payer: MEDICARE

## 2025-05-28 ENCOUNTER — OFFICE VISIT (OUTPATIENT)
Dept: INTERNAL MEDICINE CLINIC | Facility: CLINIC | Age: 80
End: 2025-05-28
Payer: MEDICARE

## 2025-05-28 VITALS
WEIGHT: 183.38 LBS | TEMPERATURE: 97 F | DIASTOLIC BLOOD PRESSURE: 80 MMHG | HEART RATE: 62 BPM | HEIGHT: 60 IN | SYSTOLIC BLOOD PRESSURE: 150 MMHG | OXYGEN SATURATION: 93 % | BODY MASS INDEX: 36 KG/M2

## 2025-05-28 DIAGNOSIS — E87.6 HYPOKALEMIA: ICD-10-CM

## 2025-05-28 DIAGNOSIS — I10 ESSENTIAL HYPERTENSION: ICD-10-CM

## 2025-05-28 DIAGNOSIS — K52.9 CHRONIC DIARRHEA: Primary | ICD-10-CM

## 2025-05-28 LAB
ANION GAP SERPL CALC-SCNC: 10 MMOL/L (ref 0–18)
BUN BLD-MCNC: 13 MG/DL (ref 9–23)
CALCIUM BLD-MCNC: 9.3 MG/DL (ref 8.7–10.6)
CHLORIDE SERPL-SCNC: 95 MMOL/L (ref 98–112)
CO2 SERPL-SCNC: 25 MMOL/L (ref 21–32)
CREAT BLD-MCNC: 0.91 MG/DL (ref 0.55–1.02)
EGFRCR SERPLBLD CKD-EPI 2021: 64 ML/MIN/1.73M2 (ref 60–?)
FASTING STATUS PATIENT QL REPORTED: NO
GLUCOSE BLD-MCNC: 93 MG/DL (ref 70–99)
OSMOLALITY SERPL CALC.SUM OF ELEC: 270 MOSM/KG (ref 275–295)
POTASSIUM SERPL-SCNC: 3.5 MMOL/L (ref 3.5–5.1)
SODIUM SERPL-SCNC: 130 MMOL/L (ref 136–145)

## 2025-05-28 PROCEDURE — 99214 OFFICE O/P EST MOD 30 MIN: CPT | Performed by: FAMILY MEDICINE

## 2025-05-28 PROCEDURE — 3077F SYST BP >= 140 MM HG: CPT | Performed by: FAMILY MEDICINE

## 2025-05-28 PROCEDURE — G2211 COMPLEX E/M VISIT ADD ON: HCPCS | Performed by: FAMILY MEDICINE

## 2025-05-28 PROCEDURE — 3008F BODY MASS INDEX DOCD: CPT | Performed by: FAMILY MEDICINE

## 2025-05-28 PROCEDURE — 36415 COLL VENOUS BLD VENIPUNCTURE: CPT | Performed by: FAMILY MEDICINE

## 2025-05-28 PROCEDURE — 80048 BASIC METABOLIC PNL TOTAL CA: CPT | Performed by: FAMILY MEDICINE

## 2025-05-28 PROCEDURE — 3079F DIAST BP 80-89 MM HG: CPT | Performed by: FAMILY MEDICINE

## 2025-05-28 PROCEDURE — 1159F MED LIST DOCD IN RCRD: CPT | Performed by: FAMILY MEDICINE

## 2025-05-28 RX ORDER — AMLODIPINE BESYLATE 2.5 MG/1
2.5 TABLET ORAL DAILY
Qty: 30 TABLET | Refills: 0 | Status: SHIPPED | OUTPATIENT
Start: 2025-05-28

## 2025-05-28 NOTE — PROGRESS NOTES
Graciela Vargas is a 80 year old female.  HPI:   Pt here for diarrhea for 2 yrs    has cscope on 6/25    Dr Garcia      Wonders if related to H pylori         no HB  no belching     stool watery    no blood     over 4-5x a day   Also has been having HA's    BP has been high    taking meds as directed    Worried about the upcomig cscope        had labs ordered      Current Medications[1]   Past Medical History[2]   Social History:  Short Social Hx on File[3]     REVIEW OF SYSTEMS:   GENERAL HEALTH: feels well otherwise       EXAM:   /80   Pulse 62   Temp 97.2 °F (36.2 °C) (Temporal)   Ht 5' (1.524 m)   Wt 183 lb 6.4 oz (83.2 kg)   SpO2 93%   BMI 35.82 kg/m²   GENERAL: well developed, well nourished,in no apparent distress  SKIN: no rashes  NECK: supple,no adenopathy  LUNGS: clear to auscultation  CARDIO: RRR without murmur  ABD  sft NT  no mass or HSM       ASSESSMENT AND PLAN:   1. Chronic diarrhea  Reminded to get mica tests done Maritza Garcia      H Pylori ordered      - H. Pylori Stool Ag, EIA [E]    2. Hypokalemia  Fani today      - Basic Metabolic Panel (8)    3. Essential hypertension  On meds as directed per pt     add norvasc2.5mg daily   fani 1 mo       The patient indicates understanding of these issues and agrees to the plan.  .         [1]   Current Outpatient Medications   Medication Sig Dispense Refill    amLODIPine (NORVASC) 2.5 MG Oral Tab Take 1 tablet (2.5 mg total) by mouth daily. 30 tablet 0    atorvastatin 40 MG Oral Tab TAKE 1 TABLET EVERY NIGHT 90 tablet 0    metoprolol tartrate 50 MG Oral Tab TAKE 1 TABLET TWICE DAILY 180 tablet 0    VALSARTAN 160 MG Oral Tab TAKE ONE TABLET BY MOUTH TWICE DAILY 180 tablet 0    HYDROCHLOROTHIAZIDE 12.5 MG Oral Tab TAKE 1 TABLET TWICE DAILY 180 tablet 3    POTASSIUM CHLORIDE 20 MEQ Oral Tab CR TAKE 1 TABLET EVERY DAY 90 tablet 3    nitroglycerin 0.4 MG Sublingual SL Tab Place 1 tablet (0.4 mg total) under the tongue every 5 (five) minutes as  needed for Chest pain. 10 tablet 0    hydrOXYzine 25 MG Oral Tab       aspirin 81 MG Oral Tab EC Take 1 tablet (81 mg total) by mouth daily.     [2]   Past Medical History:   Atherosclerosis of coronary artery    Essential hypertension    Hyperlipidemia   [3]   Social History  Socioeconomic History    Marital status:    Tobacco Use    Smoking status: Never    Smokeless tobacco: Never   Vaping Use    Vaping status: Never Used   Substance and Sexual Activity    Alcohol use: Never    Drug use: Never     Social Drivers of Health      Received from [facility]

## 2025-05-29 ENCOUNTER — LAB ENCOUNTER (OUTPATIENT)
Dept: LAB | Age: 80
End: 2025-05-29
Attending: FAMILY MEDICINE
Payer: MEDICARE

## 2025-05-29 ENCOUNTER — TELEPHONE (OUTPATIENT)
Dept: INTERNAL MEDICINE CLINIC | Facility: CLINIC | Age: 80
End: 2025-05-29

## 2025-05-29 PROCEDURE — 87338 HPYLORI STOOL AG IA: CPT | Performed by: FAMILY MEDICINE

## 2025-05-29 NOTE — TELEPHONE ENCOUNTER
Pt son say /107, Pt used amlodipine yesterday and this morning.  States has been having RIVERA's as well. Pt son states pt has foggy vision. Verified BP medications- hydrochlorothiazide was lowered to 1 tablet daily.    Please advise

## 2025-05-29 NOTE — TELEPHONE ENCOUNTER
Take another 2.5 mg amlodipine now    If BP does not get better and HA and blurry vision persist, rec ER

## 2025-06-01 LAB — H PYLORI AG STL QL IA: POSITIVE

## 2025-06-02 RX ORDER — LANSOPRAZOLE 30 MG/1
30 CAPSULE, DELAYED RELEASE ORAL 2 TIMES DAILY
Qty: 28 CAPSULE | Refills: 0 | Status: SHIPPED | OUTPATIENT
Start: 2025-06-02

## 2025-06-02 RX ORDER — CLARITHROMYCIN 500 MG/1
500 TABLET ORAL 2 TIMES DAILY
Qty: 28 TABLET | Refills: 0 | Status: SHIPPED | OUTPATIENT
Start: 2025-06-02

## 2025-06-02 RX ORDER — AMOXICILLIN 500 MG/1
1000 TABLET, FILM COATED ORAL 2 TIMES DAILY
Qty: 56 TABLET | Refills: 0 | Status: SHIPPED | OUTPATIENT
Start: 2025-06-02 | End: 2025-06-16

## 2025-06-02 NOTE — TELEPHONE ENCOUNTER
TO: Please see below. Pended medications for H Pylori, please reconcile drug-to-drug interactions. TY    Patient possibly being taken to ER for BP readings in 190s.     Spoke with patient's son regarding results and recommendations as below. Verbalized understanding. Pended orders due to interactions. While on phone, son states patient's BP has been in 190's/100s, and patient complains of headache. This RN confirmed patient is taking medications as prescribed. Son states she is taking all medications. This RN states that if the BP is in 190s, patient needs to be evaluated in ER. Verbalized understanding.       Mei Vickers MD  6/2/2025 12:59 PM CDT       + H Pylori noted     rec:   Prevacid 30mg BID for 2 weeks  Biaxin 500mg BID for 2 weeks    Amoxil 500mg 2 PO BID for 2 weeks       F/u w GI as well

## 2025-06-10 RX ORDER — FERROUS SULFATE 325(65) MG
325 TABLET, DELAYED RELEASE (ENTERIC COATED) ORAL
COMMUNITY

## 2025-06-12 ENCOUNTER — LABORATORY ENCOUNTER (OUTPATIENT)
Dept: LAB | Age: 80
End: 2025-06-12
Attending: INTERNAL MEDICINE
Payer: MEDICARE

## 2025-06-12 ENCOUNTER — ANESTHESIA EVENT (OUTPATIENT)
Dept: ENDOSCOPY | Facility: HOSPITAL | Age: 80
End: 2025-06-12
Payer: MEDICARE

## 2025-06-12 DIAGNOSIS — R19.7 DIARRHEA, UNSPECIFIED TYPE: ICD-10-CM

## 2025-06-12 LAB — IGA SERPL-MCNC: 303.1 MG/DL (ref 40–350)

## 2025-06-12 PROCEDURE — 80048 BASIC METABOLIC PNL TOTAL CA: CPT | Performed by: FAMILY MEDICINE

## 2025-06-12 PROCEDURE — 86364 TISS TRNSGLTMNASE EA IG CLAS: CPT | Performed by: INTERNAL MEDICINE

## 2025-06-12 PROCEDURE — 36415 COLL VENOUS BLD VENIPUNCTURE: CPT

## 2025-06-12 PROCEDURE — 82784 ASSAY IGA/IGD/IGG/IGM EACH: CPT

## 2025-06-13 DIAGNOSIS — E87.6 LOW SERUM POTASSIUM: Primary | ICD-10-CM

## 2025-06-13 RX ORDER — POTASSIUM CHLORIDE 1500 MG/1
20 TABLET, EXTENDED RELEASE ORAL 2 TIMES DAILY
Qty: 4 TABLET | Refills: 0 | Status: SHIPPED | OUTPATIENT
Start: 2025-06-13 | End: 2025-06-15

## 2025-06-16 ENCOUNTER — TELEPHONE (OUTPATIENT)
Dept: OTOLARYNGOLOGY | Age: 80
End: 2025-06-16

## 2025-06-16 ENCOUNTER — TELEPHONE (OUTPATIENT)
Dept: INTERNAL MEDICINE CLINIC | Facility: CLINIC | Age: 80
End: 2025-06-16

## 2025-06-16 RX ORDER — VALSARTAN 160 MG/1
160 TABLET ORAL 2 TIMES DAILY
Qty: 180 TABLET | Refills: 0 | Status: SHIPPED | OUTPATIENT
Start: 2025-06-16

## 2025-06-16 NOTE — TELEPHONE ENCOUNTER
Requesting   Name from pharmacy: Valsartan Oral Tablet 160 MG          Will file in chart as: VALSARTAN 160 MG Oral Tab    Sig: TAKE ONE TABLET BY MOUTH TWICE DAILY    Disp: 180 tablet    Refills: 0    Start: 6/14/2025    Class: Normal    Non-formulary    Last ordered: 2 months ago (4/4/2025) by Mei Vickers MD    Last refill: 4/4/2025    Rx #: [id number]    Hypertension Medications Protocol [id number]/14/2025 09:36 PM   Protocol Details Last BP reading less than 140/90    CMP or BMP in past 12 months    In person appointment or virtual visit in the past 12 mos or appointment in next 3 mos    EGFRCR or GFRNAA > 50    Medication is active on med list        LOV: 5/28/2025  RTC: 1 month  Last Relevant Labs: 5/28/2025  Filled: 4/4/2025 #180 with 0 refills    Future Appointments   Date Time Provider Department Center   6/25/2025  1:30 PM JOSH GARCIA [facility] None   7/31/2025 10:40 AM Leatha Garcia DO [facility]

## 2025-06-16 NOTE — TELEPHONE ENCOUNTER
TO: Please see below and advise regarding continued headache.    Patient still having headaches, taking potassium, skipped day of water pill thinking it is taking out needed nutrients as well. Not taking medications, taking water with lemon and castro. Headaches worse in morning. No congestion, but sometimes has blurry vision. Son concerned BP is causing blurry vision, patient resistant to go to ER.    This RN asked if anyone has taken patient's BP due to skipping diuretic, headache. Delio states no one has taken a BP reading today. This RN asked if someone can. Delio states other brother at home can take BP and call office with reading.

## 2025-06-18 ENCOUNTER — HOSPITAL ENCOUNTER (OUTPATIENT)
Age: 80
Discharge: HOME | End: 2025-06-18
Attending: EMERGENCY MEDICINE
Payer: MEDICARE

## 2025-06-18 ENCOUNTER — APPOINTMENT (OUTPATIENT)
Dept: CT IMAGING | Age: 80
End: 2025-06-18
Attending: EMERGENCY MEDICINE
Payer: MEDICARE

## 2025-06-18 VITALS
OXYGEN SATURATION: 94 % | TEMPERATURE: 98 F | HEIGHT: 64 IN | SYSTOLIC BLOOD PRESSURE: 162 MMHG | DIASTOLIC BLOOD PRESSURE: 98 MMHG | BODY MASS INDEX: 30.73 KG/M2 | RESPIRATION RATE: 18 BRPM | HEART RATE: 74 BPM | WEIGHT: 180 LBS

## 2025-06-18 DIAGNOSIS — R51.9 ACUTE NONINTRACTABLE HEADACHE, UNSPECIFIED HEADACHE TYPE: Primary | ICD-10-CM

## 2025-06-18 PROCEDURE — 99214 OFFICE O/P EST MOD 30 MIN: CPT

## 2025-06-18 PROCEDURE — 70450 CT HEAD/BRAIN W/O DYE: CPT | Performed by: EMERGENCY MEDICINE

## 2025-06-18 RX ORDER — BUTALBITAL, ACETAMINOPHEN AND CAFFEINE 50; 325; 40 MG/1; MG/1; MG/1
1-2 TABLET ORAL EVERY 6 HOURS PRN
Qty: 10 TABLET | Refills: 0 | Status: SHIPPED | OUTPATIENT
Start: 2025-06-18 | End: 2025-06-23

## 2025-06-18 NOTE — DISCHARGE INSTRUCTIONS
Follow-up with your primary care doctor and [facility] if your headache persist  Take Fioricet 1 tablet every 6 hours as needed for headache  Continue your home medication  Return if any worsening symptoms or new concern

## 2025-06-18 NOTE — ED PROVIDER NOTES
Patient Seen in: [facility]        History  Chief Complaint   Patient presents with    Headache     Stated Complaint: headache high blood pressure    Subjective:   HPI            80-year-old female with a history of coronary artery disease hypertension hypercholesterolemia presents to the immediate care for complaints of a headache.  History is provided by the patient and the son at bedside.  Patient reports that she has been having persistent headache for the last 2 months.  She has been seen multiple times in the immediate care for this complaint.  Is thought it may be secondary to her blood pressure.  Patient's blood pressure medications have been adjusted by her primary care provider but she still continues to have headache.  She has no complaints of any light sensitivity nausea or vomiting.  Denies any focal motor weakness.      Objective:     Past Medical History:    Atherosclerosis of coronary artery    Essential hypertension    Hearing impaired person, bilateral    bilateral hearing aids    High blood pressure    High cholesterol    Hyperlipidemia    Visual impairment    glasses              History reviewed. No pertinent surgical history.             No pertinent social history.            Review of Systems    Positive for stated complaint: headache high blood pressure  Other systems are as noted in HPI.  Constitutional and vital signs reviewed.      All other systems reviewed and negative except as noted above.                  Physical Exam    ED Triage Vitals [06/18/25 1443]   BP (!) 162/98   Pulse 74   Resp 18   Temp 98.2 °F (36.8 °C)   Temp src Oral   SpO2 94 %   O2 Device None (Room air)       Current Vitals:   Vital Signs  BP: (!) 162/98 (manual bp)  Pulse: 74  Resp: 18  Temp: 98.2 °F (36.8 °C)  Temp src: Oral    Oxygen Therapy  SpO2: 94 %  O2 Device: None (Room air)            Physical Exam     General: Alert and oriented. No acute distress.  HEENT: Normocephalic. No evidence of  trauma. Extraocular movements are intact.  Cardiovascular exam: Regular rate and rhythm  Lungs: Clear to auscultation bilaterally.  Abdomen: Soft, nondistended, nontender.  Extremities: No evidence of deformity. No clubbing or cyanosis.  Neuro: No focal deficit is noted.       ED Course  Labs Reviewed - No data to display  Due to patient's complaints of persistent headache in light of the fact that her blood pressure is not significantly elevated at this time a CT scan of the brain will be ordered to further evaluate   CT BRAIN OR HEAD (CPT=70450) (Final result)  Result time 06/18/25 15:50:08  Final result by Carina Quintero MD (06/18/25 15:50:08)                Impression:    CONCLUSION:  1. No acute intracranial findings  2. Cerebral atrophy with chronic microvascular ischemic changes.                       CT scan of the brain is negative for acute finding.  Patient will be discharged home with outpatient follow-up with her primary care doctor and neurology referral.  Patient will be given Fioricet to see if it helps her with her headaches.  She is asked to continue her home medications.     MDM  Patient was screened and evaluated during this visit.   As a treating physician attending to the patient, I determined, within reasonable clinical confidence and prior to discharge, that an emergency medical condition was not or was no longer present.  There was no indication for further evaluation, treatment or admission on an emergency basis.  Comprehensive verbal and written discharge and follow-up instructions were provided to help prevent relapse or worsening.  Patient was instructed to follow-up with her primary care provider for further evaluation and treatment, but to return immediately to the ER for worsening, concerning, new, changing or persisting symptoms.  I discussed the case with the patient and they had no questions, complaints, or concerns.  Patient felt comfortable going home.    ^^Please note that this  report has been produced using speech recognition software and may contain errors related to that system including, but not limited to, errors in grammar, punctuation, and spelling, as well as words and phrases that possibly may have been recognized inappropriately.  If there are any questions or concerns, contact the dictating provider for clarification      Medical Decision Making      Disposition and Plan     Clinical Impression:  1. Acute nonintractable headache, unspecified headache type         Disposition:  Discharge  6/18/2025  3:47 pm    Follow-up:  [address] [address]  Schedule an appointment as soon as possible for a visit   As needed, If symptoms worsen          Medications Prescribed:  Current Discharge Medication List        START taking these medications    Details   butalbital-acetaminophen-caffeine -40 MG Oral Tab Take 1-2 tablets by mouth every 6 (six) hours as needed for Pain.  Qty: 10 tablet, Refills: 0    Associated Diagnoses: Acute nonintractable headache, unspecified headache type                   Supplementary Documentation:

## 2025-06-18 NOTE — ED INITIAL ASSESSMENT (HPI)
Son states pt's 4th time in last couple months for h/a and high bp.     Onset of current h/a started around 0900 today.   Denies taking any meds for h/a today.   Denies n/v.

## 2025-06-25 ENCOUNTER — ANESTHESIA (OUTPATIENT)
Dept: ENDOSCOPY | Facility: HOSPITAL | Age: 80
End: 2025-06-25
Payer: MEDICARE

## 2025-06-25 ENCOUNTER — HOSPITAL ENCOUNTER (OUTPATIENT)
Facility: HOSPITAL | Age: 80
Setting detail: HOSPITAL OUTPATIENT SURGERY
Discharge: HOME | End: 2025-06-25
Attending: INTERNAL MEDICINE | Admitting: INTERNAL MEDICINE
Payer: MEDICARE

## 2025-06-25 VITALS
OXYGEN SATURATION: 98 % | RESPIRATION RATE: 18 BRPM | BODY MASS INDEX: 31.54 KG/M2 | TEMPERATURE: 98 F | HEART RATE: 64 BPM | HEIGHT: 63 IN | SYSTOLIC BLOOD PRESSURE: 154 MMHG | WEIGHT: 178 LBS | DIASTOLIC BLOOD PRESSURE: 74 MMHG

## 2025-06-25 DIAGNOSIS — R19.7 DIARRHEA, UNSPECIFIED TYPE: Primary | ICD-10-CM

## 2025-06-25 PROCEDURE — 88313 SPECIAL STAINS GROUP 2: CPT | Performed by: INTERNAL MEDICINE

## 2025-06-25 PROCEDURE — 88305 TISSUE EXAM BY PATHOLOGIST: CPT | Performed by: INTERNAL MEDICINE

## 2025-06-25 RX ORDER — SODIUM CHLORIDE, SODIUM LACTATE, POTASSIUM CHLORIDE, CALCIUM CHLORIDE 600; 310; 30; 20 MG/100ML; MG/100ML; MG/100ML; MG/100ML
INJECTION, SOLUTION INTRAVENOUS CONTINUOUS
Status: DISCONTINUED | OUTPATIENT
Start: 2025-06-25 | End: 2025-06-25

## 2025-06-25 RX ORDER — ONDANSETRON 2 MG/ML
4 INJECTION INTRAMUSCULAR; INTRAVENOUS ONCE AS NEEDED
Status: DISCONTINUED | OUTPATIENT
Start: 2025-06-25 | End: 2025-06-25

## 2025-06-25 RX ORDER — NALOXONE HYDROCHLORIDE 0.4 MG/ML
0.08 INJECTION, SOLUTION INTRAMUSCULAR; INTRAVENOUS; SUBCUTANEOUS ONCE AS NEEDED
Status: DISCONTINUED | OUTPATIENT
Start: 2025-06-25 | End: 2025-06-25

## 2025-06-25 RX ORDER — LIDOCAINE HYDROCHLORIDE 10 MG/ML
INJECTION, SOLUTION EPIDURAL; INFILTRATION; INTRACAUDAL; PERINEURAL AS NEEDED
Status: DISCONTINUED | OUTPATIENT
Start: 2025-06-25 | End: 2025-06-25 | Stop reason: SURG

## 2025-06-25 RX ADMIN — LIDOCAINE HYDROCHLORIDE 100 MG: 10 INJECTION, SOLUTION EPIDURAL; INFILTRATION; INTRACAUDAL; PERINEURAL at 12:38:00

## 2025-06-25 RX ADMIN — SODIUM CHLORIDE, SODIUM LACTATE, POTASSIUM CHLORIDE, CALCIUM CHLORIDE: 600; 310; 30; 20 INJECTION, SOLUTION INTRAVENOUS at 12:38:00

## 2025-06-25 NOTE — OPERATIVE REPORT
Operative Report-Colonoscopy with cold biopsies    Graciela Vargas 2/15/1945   [facility] [id number] MRN [id number]   Admission Date 6/25/2025 Operation Date 6/25/2025   Attending Physician Leatha Garcia DO Operating Physician Leatha Garcia DO     PREOPERATIVE DIAGNOSIS/INDICATION: Diarrhea  POSTOPERTATIVE DIAGNOSIS: Internal hemorrhoids  PROCEDURE PERFORMED: COLONOSCOPY  INFORMED CONSENT:  Once a brief history and physical examination was performed, the risks, benefits and alternatives to the procedure were discussed with the patient and/or family and informed consent was obtained. The risks of sedation, perforation, missed lesions and need for surgery were all discussed.  Patient expressed understanding of the risks and agreed to proceed.    PROCEDURE DESCRIPTION:The patient was then brought to the endoscopy suite where the patient's pulse, pulse oximetry and blood pressure were monitored. The patient was placed in the left lateral decubitus position and deep sedation was administered. Once adequate sedation was achieved, a rectal exam was performed which was normal. A lubricated tip of an Olympus video colonoscope was then inserted through the rectum and gently manipulated under direct visualization to the cecal pole and the terminal ileum. The quality of the preparation was good. Upon withdrawal of the colonoscope, careful visualization of the mucosa was performed.   Lincoln Prep Score: Right Colon 2 Transverse colon 2 Left colon 3  FINDINGS/THERAPEUTICS:  TERMINAL ILEUM: The Terminal Ileum was normal.   COLON: There was scattered vegetable debris which slightly interfered with visualization (as the patient ate yesterday). Two passes were made to the right colon. Random biopsies obtained from the right and left colon with a cold biopsy forceps to rule out microscopic colitis. No large lesions were seen but small or flat polyps could have been missed.   RECTUM: Grade II Internal hemorrhoids.  RECOMMENDATIONS:    Post Colonoscopy precautions, watch for bleeding, infection, perforation, adverse drug reactions   Follow biopsies.  Repeat colonoscopy in 2 years (if health permits) and CLD day prior.  CC Report:   Leatha Garcia DO  6/25/2025  1:02 PM

## 2025-06-25 NOTE — DISCHARGE INSTRUCTIONS
Home Care Instructions for Colonoscopy  with Sedation    Diet:  - Resume your regular diet as tolerated unless otherwise instructed.  - Start with light meals to minimize bloating.  - Do not drink alcohol today.    Medication:  - If you have questions about resuming your normal medications, please contact your Primary Care Physician.    Activities:  - Take it easy today. Do not return to work today.  - Do not drive today.  - Do not operate any machinery today (including kitchen equipment).    Colonoscopy:  - You may notice some rectal \"spotting\" (a little blood on the toilet tissue) for a day or two after the exam. This is normal.  - If you experience any rectal bleeding (not spotting), persistent tenderness or sharp severe abdominal pains, oral temperature over 100 degrees Fahrenheit, light-headedness or dizziness, or any other problems, contact your doctor.    **If unable to reach your doctor, please go to the [facility] Emergency Room**    - Your referring physician will receive a full report of your examination.  - If you do not hear from your doctor's office within two weeks of your biopsy, please call them for your results.

## 2025-06-25 NOTE — ANESTHESIA POSTPROCEDURE EVALUATION
[facility]    Graciela Vargas Patient Status:  Hospital Outpatient Surgery   Age/Gender 80 year old female MRN [id number]   Location [facility] ENDOSCOPY PAIN CENTER Attending Leatha Garcia DO   Hosp Day # 0 PCP Mei Vickers MD       Anesthesia Post-op Note    COLONOSCOPY with biopsies    Procedure Summary       Date: 06/25/25 Room / Location:  ENDOSCOPY 02 /  ENDOSCOPY    Anesthesia Start: 1234 Anesthesia Stop:     Procedure: COLONOSCOPY with biopsies Diagnosis:       Diarrhea, unspecified type      (hemorrhoids)    Surgeons: Leatha Garcia DO Anesthesiologist: Shayan Rodrigez DO    Anesthesia Type: MAC ASA Status: 2            Anesthesia Type: MAC    Vitals Value Taken Time   /67 06/25/25 13:03   Temp  06/25/25 13:03   Pulse 63 06/25/25 13:03   Resp  06/25/25 13:03   SpO2 98 06/25/25 13:03           Patient Location: Endoscopy    Anesthesia Type: MAC    Airway Patency: patent    Postop Pain Control: adequate    Mental Status: preanesthetic baseline    Nausea/Vomiting: none    Cardiopulmonary/Hydration status: stable euvolemic    Complications: no apparent anesthesia related complications    Postop vital signs: stable    Dental Exam: Unchanged from Preop    Patient to be discharged home when criteria met.

## 2025-06-25 NOTE — H&P
History & Physical Examination    Patient Name: Graciela Vargas  MRN: [id number]  CSN: [id number]  YOB: 1945    Diagnosis: diarrhea    Present Illness: as above    Prescriptions Prior to Admission[1]  Current Hospital Medications[2]    Allergies: Allergies[3]    Past Medical History[4]  Past Surgical History[5]  Family History[6]  Social History     Tobacco Use    Smoking status: Never    Smokeless tobacco: Never   Substance Use Topics    Alcohol use: Never       SYSTEM Check if Review is Normal Check if Physical Exam is Normal If not normal, please explain:   HEENT [x ] [x ]    NECK & BACK [x ] [x ]    HEART [ ] [x ] H/o HTN   LUNGS [x ] [ ]    ABDOMEN [ ] [x ] See hpi   UROGENITAL [ x] [ ] Exam declined   EXTREMITIES [ x] [x ]    OTHER        [ x ] I have discussed the risks and benefits and alternatives with the patient/family.  They understand and agree to proceed with plan of care.  [ x ] I have reviewed the History and Physical done within the last 30 days.  Any changes noted above.    Leatha Garcia DO  2025  12:22 PM           [1]   Medications Prior to Admission   Medication Sig Dispense Refill Last Dose/Taking    ferrous sulfate 325 (65 FE) MG Oral Tab EC Take 1 tablet (325 mg total) by mouth daily with breakfast.   Taking    lansoprazole (PREVACID) 30 MG Oral Capsule Delayed Release Take 1 capsule (30 mg total) by mouth 2 (two) times daily. (Patient taking differently: Take 1 capsule (30 mg total) by mouth daily.) 28 capsule 0 Taking Differently    clarithromycin 500 MG Oral Tab Take 1 tablet (500 mg total) by mouth 2 (two) times daily. 28 tablet 0 Taking    [] amoxicillin 500 MG Oral Tab Take 2 tablets (1,000 mg total) by mouth 2 (two) times daily for 14 days. 56 tablet 0 Taking    amLODIPine (NORVASC) 2.5 MG Oral Tab Take 1 tablet (2.5 mg total) by mouth daily. 30 tablet 0 Past Week    atorvastatin 40 MG Oral Tab TAKE 1 TABLET EVERY NIGHT 90 tablet 0 Taking    []  polyethylene glycol, PEG 3350-KCl-NaBcb-NaCl-NaSulf, 236 g Oral Recon Soln Take 4,000 mL by mouth once for 1 dose. 4000 mL 0 Taking    metoprolol tartrate 50 MG Oral Tab TAKE 1 TABLET TWICE DAILY 180 tablet 0 2025 at 10:30 AM    HYDROCHLOROTHIAZIDE 12.5 MG Oral Tab TAKE 1 TABLET TWICE DAILY 180 tablet 3 Taking    POTASSIUM CHLORIDE 20 MEQ Oral Tab CR TAKE 1 TABLET EVERY DAY 90 tablet 3 Taking    hydrOXYzine 25 MG Oral Tab Take 1 tablet (25 mg total) by mouth every 8 (eight) hours as needed.   Taking As Needed    aspirin 81 MG Oral Tab EC Take 1 tablet (81 mg total) by mouth in the morning.   2025    [] butalbital-acetaminophen-caffeine -40 MG Oral Tab Take 1-2 tablets by mouth every 6 (six) hours as needed for Pain. 10 tablet 0     VALSARTAN 160 MG Oral Tab TAKE ONE TABLET BY MOUTH TWICE DAILY 180 tablet 0     [] Potassium Chloride ER 20 MEQ Oral Tab CR Take 20 mEq by mouth 2 (two) times daily for 2 days. 4 tablet 0    [2]   Current Facility-Administered Medications   Medication Dose Route Frequency    lactated ringers infusion   Intravenous Continuous   [3]   Allergies  Allergen Reactions    Celexa [Citalopram] OTHER (SEE COMMENTS)     Schley voices   [4]   Past Medical History:   Atherosclerosis of coronary artery    Essential hypertension    Hearing impaired person, bilateral    bilateral hearing aids    High blood pressure    High cholesterol    Hyperlipidemia    Visual impairment    glasses   [5] History reviewed. No pertinent surgical history.  [6] History reviewed. No pertinent family history.

## 2025-06-25 NOTE — ANESTHESIA PREPROCEDURE EVALUATION
PRE-OP EVALUATION    Patient Name: Graciela Vargas    Admit Diagnosis: Diarrhea, unspecified type [R19.7]    Pre-op Diagnosis: Diarrhea, unspecified type [R19.7]    COLONOSCOPY    Anesthesia Procedure: COLONOSCOPY    Surgeons and Role:     * Leatha Garcia, DO - Primary    Pre-op vitals reviewed.  Temp: 97.5 °F (36.4 °C)  Pulse: 71  Resp: 18  BP: 144/62  SpO2: 97 %  Body mass index is 31.53 kg/m².    Current medications reviewed.  Hospital Medications:  Current Medications[1]    Outpatient Medications:   Prescriptions Prior to Admission[2]    Allergies: Celexa [citalopram]      Anesthesia Evaluation        Anesthetic Complications           GI/Hepatic/Renal             (-) chronic renal disease   (-) liver disease                 Cardiovascular  Comment: 2/2021    Conclusions:     1. Left ventricle: The cavity size was mildly increased. Wall thickness was      normal. Systolic function was vigorous. The estimated ejection fraction      was 70-75%. No diagnostic evidence for regional wall motion      abnormalities. Doppler parameters are consistent with abnormal left      ventricular relaxation - grade 1 diastolic dysfunction.   2. Mitral valve: There was mild regurgitation.   3. Left atrium: The left atrium was mildly dilated. The left atrial volume      was mildly increased.     Impressions:  No previous study was available for comparison.   *                   (+) hypertension     (+) CAD  (-) past MI                              Endo/Other    Negative endo/other ROS.  (-) diabetes     (-) hypothyroidism  (-) hyperthyroidism                     Pulmonary      (-) asthma  (-) COPD                   Neuro/Psych    Negative neuro/psych ROS.      (-) CVA   (-) TIA  (-) seizures                       Past Surgical History[3]  Social Hx on file[4]  History   Drug Use Unknown     Available pre-op labs reviewed.  Lab Results   Component Value Date    MCV 90.5 05/19/2025    MCHC 34.3 05/19/2025     Lab Results    Component Value Date     2025    K 3.2 (L) 2025     2025    CO2 23.0 2025    BUN 14 2025    CREATSERUM 0.99 2025     (H) 2025    CA 9.5 2025            Airway      Mallampati: II  Mouth opening: >3 FB  TM distance: > 6 cm  Neck ROM: full Cardiovascular    Cardiovascular exam normal.         Dental    Dentition appears grossly intact         Pulmonary    Pulmonary exam normal.                 Other findings              ASA: 2   Plan: MAC  NPO status verified and patient meets guidelines.          Plan/risks discussed with: patient and child/children                Present on Admission:  **None**             [1]    lactated ringers infusion   Intravenous Continuous   [2]   Medications Prior to Admission   Medication Sig Dispense Refill Last Dose/Taking    ferrous sulfate 325 (65 FE) MG Oral Tab EC Take 1 tablet (325 mg total) by mouth daily with breakfast.   Taking    lansoprazole (PREVACID) 30 MG Oral Capsule Delayed Release Take 1 capsule (30 mg total) by mouth 2 (two) times daily. (Patient taking differently: Take 1 capsule (30 mg total) by mouth daily.) 28 capsule 0 Taking Differently    clarithromycin 500 MG Oral Tab Take 1 tablet (500 mg total) by mouth 2 (two) times daily. 28 tablet 0 Taking    [] amoxicillin 500 MG Oral Tab Take 2 tablets (1,000 mg total) by mouth 2 (two) times daily for 14 days. 56 tablet 0 Taking    amLODIPine (NORVASC) 2.5 MG Oral Tab Take 1 tablet (2.5 mg total) by mouth daily. 30 tablet 0 Past Week    atorvastatin 40 MG Oral Tab TAKE 1 TABLET EVERY NIGHT 90 tablet 0 Taking    [] polyethylene glycol, PEG 3350-KCl-NaBcb-NaCl-NaSulf, 236 g Oral Recon Soln Take 4,000 mL by mouth once for 1 dose. 4000 mL 0 Taking    metoprolol tartrate 50 MG Oral Tab TAKE 1 TABLET TWICE DAILY 180 tablet 0 2025 at 10:30 AM    HYDROCHLOROTHIAZIDE 12.5 MG Oral Tab TAKE 1 TABLET TWICE DAILY 180 tablet 3 Taking    POTASSIUM  CHLORIDE 20 MEQ Oral Tab CR TAKE 1 TABLET EVERY DAY 90 tablet 3 Taking    hydrOXYzine 25 MG Oral Tab Take 1 tablet (25 mg total) by mouth every 8 (eight) hours as needed.   Taking As Needed    aspirin 81 MG Oral Tab EC Take 1 tablet (81 mg total) by mouth in the morning.   2025    [] butalbital-acetaminophen-caffeine -40 MG Oral Tab Take 1-2 tablets by mouth every 6 (six) hours as needed for Pain. 10 tablet 0     VALSARTAN 160 MG Oral Tab TAKE ONE TABLET BY MOUTH TWICE DAILY 180 tablet 0     [] Potassium Chloride ER 20 MEQ Oral Tab CR Take 20 mEq by mouth 2 (two) times daily for 2 days. 4 tablet 0    [3] History reviewed. No pertinent surgical history.  [4]   Social History  Socioeconomic History    Marital status:    Tobacco Use    Smoking status: Never    Smokeless tobacco: Never   Vaping Use    Vaping status: Never Used   Substance and Sexual Activity    Alcohol use: Never    Drug use: Never

## 2025-07-07 RX ORDER — BUDESONIDE 3 MG/1
9 CAPSULE, COATED PELLETS ORAL EVERY MORNING
Qty: 180 CAPSULE | Refills: 0 | Status: SHIPPED | OUTPATIENT
Start: 2025-07-07 | End: 2025-10-05

## 2025-07-07 NOTE — TELEPHONE ENCOUNTER
MCM sent to patient informing they are due for 1 month follow up      Requesting    Name from pharmacy: Metoprolol Tartrate Oral Tablet 50 MG         Will file in chart as: METOPROLOL TARTRATE 50 MG Oral Tab    Sig: TAKE 1 TABLET TWICE DAILY    Disp: 180 tablet    Refills: 3    Start: 7/4/2025    Class: Normal    Non-formulary    Last ordered: 2 months ago (4/21/2025) by Mei Vickers MD    Last refill: 4/23/2025    Rx #: [id number]    Hypertension Medications Protocol [id number]/2025 02:10 AM   Protocol Details Last BP reading less than 140/90    CMP or BMP in past 12 months    In person appointment or virtual visit in the past 12 mos or appointment in next 3 mos    EGFRCR or GFRNAA > 50    Medication is active on med list        LOV: 5/28/2025  RTC: 1 month  Last Relevant Labs: 6/12/2025  Filled: 4/21/2025 #180 with 0 refills    No future appointments.

## 2025-07-09 RX ORDER — METOPROLOL TARTRATE 50 MG
50 TABLET ORAL 2 TIMES DAILY
Qty: 180 TABLET | Refills: 0 | Status: SHIPPED | OUTPATIENT
Start: 2025-07-09

## 2025-07-10 ENCOUNTER — OFFICE VISIT (OUTPATIENT)
Dept: AUDIOLOGY | Age: 80
End: 2025-07-10
Attending: OTOLARYNGOLOGY

## 2025-07-10 ENCOUNTER — APPOINTMENT (OUTPATIENT)
Dept: OTOLARYNGOLOGY | Age: 80
End: 2025-07-10

## 2025-07-10 VITALS — WEIGHT: 175 LBS

## 2025-07-10 DIAGNOSIS — H60.543 ECZEMA OF EXTERNAL EAR, BILATERAL: ICD-10-CM

## 2025-07-10 DIAGNOSIS — H90.3 SENSORINEURAL HEARING LOSS (SNHL) OF BOTH EARS: Primary | ICD-10-CM

## 2025-07-10 DIAGNOSIS — H91.90 HEARING LOSS, UNSPECIFIED HEARING LOSS TYPE, UNSPECIFIED LATERALITY: Primary | ICD-10-CM

## 2025-07-10 RX ORDER — MOMETASONE FUROATE 1 MG/G
CREAM TOPICAL
Qty: 15 G | Refills: 0 | Status: SHIPPED | OUTPATIENT
Start: 2025-07-10

## 2025-07-18 RX ORDER — ATORVASTATIN CALCIUM 40 MG/1
40 TABLET, FILM COATED ORAL NIGHTLY
Qty: 90 TABLET | Refills: 1 | Status: SHIPPED | OUTPATIENT
Start: 2025-07-18

## 2025-07-18 NOTE — TELEPHONE ENCOUNTER
Atorvastatin 40 mg  Filled 5-5-25  Qty 90  0 refills  Future Appointments   Date Time Provider Department Center   8/6/2025 10:00 AM Leatha Garcia DO [facility]   LOV 2-5-25 TO  Labs 1-31-25 Lipid

## 2025-08-29 ENCOUNTER — PATIENT OUTREACH (OUTPATIENT)
Dept: CASE MANAGEMENT | Age: 80
End: 2025-08-29

## (undated) DEVICE — GIJAW SINGLE-USE BIOPSY FORCEPS WITH NEEDLE: Brand: GIJAW

## (undated) DEVICE — KIT CUSTOM ENDOPROCEDURE STERIS

## (undated) DEVICE — 1200CC GUARDIAN II: Brand: GUARDIAN

## (undated) DEVICE — MASK,FACE,MAXFLUIDPROTECT,SHIELD/TIES: Brand: MEDLINE

## (undated) DEVICE — 3M™ RED DOT™ MONITORING ELECTRODE WITH FOAM TAPE AND STICKY GEL, 50/BAG, 20/CASE, 72/PLT 2570: Brand: RED DOT™

## (undated) DEVICE — KIT VLV 5 PC AIR H2O SUCT BX ENDOGATOR CONN

## (undated) DEVICE — 10FT COMBINED O2 DELIVERY/CO2 MONITORING. FILTER WITH MICROSTREAM TYPE LUER: Brand: DUAL ADULT NASAL CANNULA

## (undated) DEVICE — V2 SPECIMEN COLLECTION MANIFOLD KIT: Brand: NEPTUNE

## (undated) NOTE — LETTER
Patient Name: Graciela Vargas DOB-Age / Sex: 2/15/1945-A: 80 y  female   Medical Records: [id number] CSN: [id number]    ABNORMAL VALUES  Surgeon(s):  Leatha Garcia DO  Anesthesia Type: MAC  Date of Surgery: 2025  Procedure Description: COLONOSCOPY  Primary Surgeon:  Leatha Garcia DO  Phone Number: [phone]    PLEASE NOTE THE FOLLOWING ABNORMALITIES:   Chemistry:  Potassium 3.2  ________________________________________________________  Anesthesia to review patient's chart.

## (undated) NOTE — LETTER
03/24/22        Graciela Morfin [id number]      Dear Evita Downey records indicate that you have outstanding lab work and or testing that was ordered for you and has not yet been completed:  Cardiac Holter Monitor     To provide you with the best possible care, please complete these orders at your earliest convenience. If you have recently completed these orders please disregard this letter. If you have any questions please call the office at Dept: [phone].      Thank you,       Shane Delarosa MD